# Patient Record
Sex: FEMALE | Race: WHITE | Employment: OTHER | URBAN - METROPOLITAN AREA
[De-identification: names, ages, dates, MRNs, and addresses within clinical notes are randomized per-mention and may not be internally consistent; named-entity substitution may affect disease eponyms.]

---

## 2017-07-11 ENCOUNTER — HOSPITAL ENCOUNTER (OUTPATIENT)
Dept: PHYSICAL THERAPY | Age: 72
Discharge: HOME | End: 2017-07-11
Payer: MEDICARE

## 2017-07-11 ENCOUNTER — HOSPITAL ENCOUNTER (OUTPATIENT)
Dept: SURGERY | Age: 72
Discharge: HOME | End: 2017-07-11
Payer: MEDICARE

## 2017-07-11 VITALS
DIASTOLIC BLOOD PRESSURE: 73 MMHG | HEART RATE: 68 BPM | TEMPERATURE: 97 F | RESPIRATION RATE: 18 BRPM | SYSTOLIC BLOOD PRESSURE: 160 MMHG | WEIGHT: 171 LBS | HEIGHT: 61 IN | OXYGEN SATURATION: 94 % | BODY MASS INDEX: 32.28 KG/M2

## 2017-07-11 LAB
ANION GAP BLD CALC-SCNC: 11 MMOL/L (ref 7–16)
APPEARANCE UR: CLEAR
APTT PPP: 25.7 SEC (ref 23.5–31.7)
BACTERIA SPEC CULT: NORMAL
BACTERIA URNS QL MICRO: 0 /HPF
BASOPHILS # BLD AUTO: 0.1 K/UL (ref 0–0.2)
BASOPHILS # BLD: 1 % (ref 0–2)
BILIRUB UR QL: NEGATIVE
BUN SERPL-MCNC: 16 MG/DL (ref 8–23)
CALCIUM SERPL-MCNC: 9.1 MG/DL (ref 8.3–10.4)
CASTS URNS QL MICRO: 0 /LPF
CHLORIDE SERPL-SCNC: 103 MMOL/L (ref 98–107)
CO2 SERPL-SCNC: 25 MMOL/L (ref 21–32)
COLOR UR: YELLOW
CREAT SERPL-MCNC: 0.89 MG/DL (ref 0.6–1)
DIFFERENTIAL METHOD BLD: ABNORMAL
EOSINOPHIL # BLD: 0.3 K/UL (ref 0–0.8)
EOSINOPHIL NFR BLD: 5 % (ref 0.5–7.8)
EPI CELLS #/AREA URNS HPF: ABNORMAL /HPF
ERYTHROCYTE [DISTWIDTH] IN BLOOD BY AUTOMATED COUNT: 13.7 % (ref 11.9–14.6)
GLUCOSE SERPL-MCNC: 177 MG/DL (ref 65–100)
GLUCOSE UR STRIP.AUTO-MCNC: NEGATIVE MG/DL
HCT VFR BLD AUTO: 45.6 % (ref 35.8–46.3)
HGB BLD-MCNC: 15.2 G/DL (ref 11.7–15.4)
HGB UR QL STRIP: ABNORMAL
IMM GRANULOCYTES # BLD: 0 K/UL (ref 0–0.5)
IMM GRANULOCYTES NFR BLD AUTO: 0.2 % (ref 0–5)
INR PPP: 0.9 (ref 0.9–1.2)
KETONES UR QL STRIP.AUTO: NEGATIVE MG/DL
LEUKOCYTE ESTERASE UR QL STRIP.AUTO: NEGATIVE
LYMPHOCYTES # BLD AUTO: 26 % (ref 13–44)
LYMPHOCYTES # BLD: 1.4 K/UL (ref 0.5–4.6)
MCH RBC QN AUTO: 30.8 PG (ref 26.1–32.9)
MCHC RBC AUTO-ENTMCNC: 33.3 G/DL (ref 31.4–35)
MCV RBC AUTO: 92.5 FL (ref 79.6–97.8)
MONOCYTES # BLD: 0.3 K/UL (ref 0.1–1.3)
MONOCYTES NFR BLD AUTO: 5 % (ref 4–12)
NEUTS SEG # BLD: 3.4 K/UL (ref 1.7–8.2)
NEUTS SEG NFR BLD AUTO: 63 % (ref 43–78)
NITRITE UR QL STRIP.AUTO: NEGATIVE
PH UR STRIP: 5.5 [PH] (ref 5–9)
PLATELET # BLD AUTO: 194 K/UL (ref 150–450)
PMV BLD AUTO: 10.2 FL (ref 10.8–14.1)
POTASSIUM SERPL-SCNC: 4.2 MMOL/L (ref 3.5–5.1)
PROT UR STRIP-MCNC: NEGATIVE MG/DL
PROTHROMBIN TIME: 10 SEC (ref 9.6–12)
RBC # BLD AUTO: 4.93 M/UL (ref 4.05–5.25)
RBC #/AREA URNS HPF: ABNORMAL /HPF
SERVICE CMNT-IMP: NORMAL
SODIUM SERPL-SCNC: 139 MMOL/L (ref 136–145)
SP GR UR REFRACTOMETRY: 1.02 (ref 1–1.02)
UROBILINOGEN UR QL STRIP.AUTO: 0.2 EU/DL (ref 0.2–1)
WBC # BLD AUTO: 5.5 K/UL (ref 4.3–11.1)
WBC URNS QL MICRO: ABNORMAL /HPF

## 2017-07-11 PROCEDURE — 85730 THROMBOPLASTIN TIME PARTIAL: CPT | Performed by: PHYSICIAN ASSISTANT

## 2017-07-11 PROCEDURE — G8978 MOBILITY CURRENT STATUS: HCPCS

## 2017-07-11 PROCEDURE — 97161 PT EVAL LOW COMPLEX 20 MIN: CPT

## 2017-07-11 PROCEDURE — 80048 BASIC METABOLIC PNL TOTAL CA: CPT | Performed by: PHYSICIAN ASSISTANT

## 2017-07-11 PROCEDURE — G8980 MOBILITY D/C STATUS: HCPCS

## 2017-07-11 PROCEDURE — 85025 COMPLETE CBC W/AUTO DIFF WBC: CPT | Performed by: PHYSICIAN ASSISTANT

## 2017-07-11 PROCEDURE — 77030027138 HC INCENT SPIROMETER -A

## 2017-07-11 PROCEDURE — G8979 MOBILITY GOAL STATUS: HCPCS

## 2017-07-11 PROCEDURE — 87641 MR-STAPH DNA AMP PROBE: CPT | Performed by: PHYSICIAN ASSISTANT

## 2017-07-11 PROCEDURE — 93005 ELECTROCARDIOGRAM TRACING: CPT | Performed by: ANESTHESIOLOGY

## 2017-07-11 PROCEDURE — 81001 URINALYSIS AUTO W/SCOPE: CPT | Performed by: PHYSICIAN ASSISTANT

## 2017-07-11 PROCEDURE — 85610 PROTHROMBIN TIME: CPT | Performed by: PHYSICIAN ASSISTANT

## 2017-07-11 PROCEDURE — 36415 COLL VENOUS BLD VENIPUNCTURE: CPT | Performed by: PHYSICIAN ASSISTANT

## 2017-07-11 RX ORDER — SELENIUM 100 MCG
TABLET ORAL DAILY
COMMUNITY
End: 2022-02-10 | Stop reason: ALTCHOICE

## 2017-07-11 NOTE — PERIOP NOTES
URINALYSIS W/ RFLX MICROSCOPIC [[id number]] (Order [id number])   Lab   Date: 7/11/2017 Department: [facility] Pre Assessment Released By: Edwina Maradiaga (auto-released) Authorizing: MARIA G Ko   7/11/2017 11:54 AM - Viraj, Lab [facility]   Component Results   Component Value Flag Ref Range Units Status   Color YELLOW     Final   Appearance CLEAR     Final   Specific gravity 1.018  1.001 - 1.023   Final   pH (UA) 5.5  5.0 - 9.0   Final   Protein NEGATIVE   NEG mg/dL Final   Glucose NEGATIVE    mg/dL Final   Ketone NEGATIVE   NEG mg/dL Final   Bilirubin NEGATIVE   NEG   Final   Blood MODERATE (A) NEG   Final   Urobilinogen 0.2  0.2 - 1.0 EU/dL Final   Nitrites NEGATIVE   NEG   Final   Leukocyte Esterase NEGATIVE   NEG   Final   WBC 0-3  0 /hpf Final   RBC 3-5  0 /hpf Final   Epithelial cells 3-5  0 /hpf Final   Bacteria 0  0 /hpf Final   Casts 0  0 /lpf Final

## 2017-07-11 NOTE — PERIOP NOTES
Patient verified name, , and surgery as listed in [facility]. Type 3 surgery, PAT joint assessment complete. Labs per surgeon: cbc, bmp, ua, pt/inr, ptt, mrsa/mssa swab, T&S DOS; results within anesthesia limits. Labs per anesthesia protocol: All required lab work included in surgeon's orders. Abnormal UA routed via [facility] to Dr. Aracelis Grover new orders received at this time. EKG: completed 17; results to be reviewed by anesthesia. 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 \" Anesthesia to review patient's chart, to determine if patient needs to be seen by Cardiology prior to procedure. Previous EKG, echo, stress, and office note requested from [facility] to be faxed to [phone]. Charge nurse to followd-up. Patient scheduled to see [facility] 17 at 1500. Office note to be printed and placed on chart after appointment. Charge nurse to follow-up. Hibiclens and instructions given per hospital policy. Nasal Swab collected per MD order and instructions for Mupirocin nasal ointment if required. Patient provided with handouts including Guide to Surgery, Pain Management, Hand Hygiene, Blood Transfusion Education, and [facility] Brochure. Patient answered medical/surgical history questions at their best of ability. All prior to admission medications documented in [facility]. Original medication prescription bottle NOT visualized during patient appointment. Patient instructed to hold all vitamins 7 days prior to surgery and NSAIDS 5 days prior to surgery. Medications to be held prior to surgery: All vitamins/supplements/herbals    Patient instructed to continue previous medications as prescribed prior to surgery and to take the following medications the day of surgery according to anesthesia guidelines with a small sip of water: None. Patient taught back and verbalized understanding.

## 2017-07-11 NOTE — PERIOP NOTES
Patient does not have a PCP, states she needs \"to find a new one. \" Patient provided \"find a physician\" card.

## 2017-07-11 NOTE — PROGRESS NOTES
17 1030   Oxygen Therapy   O2 Sat (%) 96 %   Pulse via Oximetry 73 beats per minute   O2 Device Room air   Pre-Treatment   Breath Sounds Bilateral Clear;Diminished   Pre FEV1 (liters) 1.4 liters   % Predicted 73   Incentive Spirometry Treatment   Actual Volume (ml) 1500 ml     Initial respiratory Assessment completed with pt. Pt was interviewed and evaluated in Joint camp prior to surgery. Patient ID:  Belkis Berkowitz  [id number]  5 y.o.  1945  Surgeon: Dr. Sarah Hernandez  Date of Surgery: 2017  Procedure: Total Right Knee Arthroplasty  Primary Care Physician: None None  Specialists:                                  Pt instructed in the use of Incentive Spirometry. Pt instructed to bring Incentive Spirometer back on date of surgery & to start using Is upon return to pt room. Pt taught proper cough technique      History of smokin-2 cigarettes  Day off & on for 12 years   Quit date:1977    Secondhand smoke:FATHER SMOKED CIGARS      Past procedures with Oxygen desaturation:PNEUMONTHORAX AFTER BIOPSEY    Past Medical History:   Diagnosis Date    Adverse effect of anesthesia     delayed awakening    Anxiety 2012    Bilateral tinnitus     Breast tenderness 2012    Environmental and seasonal allergies     Former smoker     Headache 2012    Hematuria, microscopic 2012    per patient Chronic-last seen by urology .      Hiatal hernia     Hyperglycemia 2012    Hemoglobin A1c 7 on 3/17/17, non-fasting glucose 177 on 17    Hyperlipidemia 2012    IBS (irritable bowel syndrome)     Osteoarthritis 2012    Sarcoidosis ([address] 2012    Thyroid nodule 2012    3 lesions     Tortuous aorta ([address]     per patient seen at [facility] \"several years ago\"    Unilateral primary osteoarthritis, right knee     Vitamin D deficiency 2012    Weight gain 2012                                    ENT:SINUS  DIFFICULTY SWALLOWING, CHOKES                                                                                                                      Respiratory history:SARCOIDOSIS, HX OF PNEUMONTHORAX                                 SOB  ON EXERTION                                   Respiratory meds:                                                         FAMILY PRESENT:                     NO                                        PAST SLEEP STUDY:        YES        HX OF JOSE RAFAEL:                        YES                                   JOSE RAFAEL assessment:                                               SLEEPS ON SIDE                                                   PHYSICAL EXAM   Body mass index is 32.31 kg/(m^2).    Visit Vitals    /73 (BP 1 Location: Right arm, BP Patient Position: At rest;Sitting)    Pulse 68    Temp 97 °F (36.1 °C)    Resp 18    Ht 5' 1\" (1.549 m)    Wt 77.6 kg (171 lb)    SpO2 94%    BMI 32.31 kg/m2     Neck circumference:  36    cm    Loud snoring:YES     Witnessed apnea or wakening gasping or choking:,DENIES,      Awakens with headaches:YES    Morning or daytime tiredness/ sleepiness:    TIRED     Dry mouth or sore throat in morning:YES    Freidman stage:4    SACS score:3    STOP/BAN                              CPAP:NONE                CONT SAT HS        Referrals:    Pt. Phone Number:

## 2017-07-11 NOTE — PROGRESS NOTES
Kang Tipton  : (16 y.o.) [address] at [address], [address] [place] [address]  [phone]       Physical Therapy Prehab Plan of Treatment and Evaluation Summary:2017    ICD-10: Treatment Diagnosis:   · Pain in Right Knee (M25.561)  · Stiffness of Right Knee, Not elsewhere classified (M25.661)  Precautions/Allergies:   Review of patient's allergies indicates no known allergies. MEDICAL/REFERRING DIAGNOSIS:  Unilateral primary osteoarthritis, right knee [M17.11]  REFERRING PHYSICIAN: Aniya Washington., *  DATE OF SURGERY: 17   Assessment:   Comments:  Motivated  and prepared for surgery. Plans on going home at [place] [address] (Impacting functional limitations):  Ms. Yesi Thompson presents with the following right lower extremity(s) problems:  1. Strength  2. Range of Motion  3. Home Exercise Program  4. Pain   INTERVENTIONS PLANNED:  1. Home Exercise Program  2. Educational Discussion     TREATMENT PLAN: Effective Dates: 2017 TO 2017. Frequency/Duration: Patient to continue to perform home exercise program at least twice per day up until her surgery. GOALS: (Goals have been discussed and agreed upon with patient.)  Discharge Goals: Time Frame: 1 Day  1. Patient will demonstrate independence with a home exercise program designed to increase strength, range of motion and pain control to minimize functional deficits and optimize patient for total joint replacement. Rehabilitation Potential For Stated Goals: Good  Regarding Luisito Kelley's therapy, I certify that the treatment plan above will be carried out by a therapist or under their direction.   Thank you for this referral,  Cheng Oviedo, PT               HISTORY:   Present Symptoms:  Pain Intensity 1: 10 (at its worst)  Pain Orientation 1: Anterior, Lateral, Medial, Right   History of Present Injury/Illness (Reason for Referral):  Medical/Referring Diagnosis: Unilateral primary osteoarthritis, right knee [M17.11]   Past Medical History/Comorbidities:   Ms. Lisy Barron  has a past medical history of Anxiety (2012); Bilateral tinnitus; Breast tenderness (2012); Headache (2012); Hematuria, microscopic (2012); Hiatal hernia; Hyperglycemia (2012); Hyperlipidemia (2012); IBS (irritable bowel syndrome); Osteoarthritis (2012); Sarcoidosis ([facility] (2012); Thyroid nodule (2012); Vitamin D deficiency (2012); and Weight gain (2012). Ms. Lisy Barron  has a past surgical history that includes cholecystectomy; endoscopy, colon, diagnostic; tonsillectomy;  section; and colonoscopy ().   Social History/Living Environment:   Home Environment: Private residence  # Steps to Enter: 0  One/Two Story Residence: Two story, live on 1st floor  # of Interior Steps: 12  Interior Rails: Left  Living Alone: No  Support Systems: Child(artie), Spouse/Significant Other/Partner  Patient Expects to be Discharged to[de-identified] Private residence  Current DME Used/Available at Home: Lenin Favors, rolling, Cane, straight, Crutches, Commode, bedside  Tub or Shower Type: Tub/Shower combination  Work/Activity:  retired  Dominant Side:  RIGHT  Current Medications:  See Pre-assessment nursing note   Number of Personal Factors/Comorbidities that affect the Plan of Care: 0: LOW COMPLEXITY   EXAMINATION:   ADLs (Current Functional Status):   Ambulation:  [x] Independent  [] Walk Indoors Only  [] Walk Outdoors  [] Use Assistive Device  [] Use Wheelchair Only Dressing:  [x] Independent  Requires Assistance from Someone for:  [] Sock/Shoes  [] Pants  [] Everything   Bathing/Showering:   [x] Independent  [] Requires Assistance from Someone  [] [address]:  [] Routine house and yard work  [x] Light Housework Only  [] None   Observation/Orthostatic Postural Assessment:       ROM/Flexibility:   AROM: Within functional limits (L LE)                       RLE AROM  R Knee Flexion: 110  R Knee Extension: 11   Strength:   Strength: Generally decreased, functional (L LE 4/5)              RLE Strength  R Hip Flexion: 4  R Knee Extension: 4  R Ankle Dorsiflexion: 4   Functional Mobility:    Sensation: Intact (LL E)    Stand to Sit: Independent  Sit to Stand: Independent  Stand Pivot Transfers: Independent  Distance (ft): 1000 Feet (ft)  Ambulation - Level of Assistance: Modified independent  Assistive Device: Crutches (1 crutch)  Base of Support: Widened  Speed/Angelia: Pace decreased (<100 feet/min)  Step Length: Left shortened  Stance: Right decreased  Gait Abnormalities: Antalgic          Balance:    Sitting: Intact  Standing: With support   Body Structures Involved:  1. Bones  2. Joints  3. Muscles Body Functions Affected:  1. Neuromusculoskeletal  2. Movement Related Activities and Participation Affected:  1. Mobility   Number of elements that affect the Plan of Care: 4+: HIGH COMPLEXITY   CLINICAL PRESENTATION:   Presentation: Stable and uncomplicated: LOW COMPLEXITY   CLINICAL DECISION MAKING:   Outcome Measure: Tool Used: Lower Extremity Functional Scale (LEFS)  Score:  Initial: 15/80 Most Recent: X/80 (Date: -- )   Interpretation of Score: 20 questions each scored on a 5 point scale with 0 representing \"extreme difficulty or unable to perform\" and 4 representing \"no difficulty\". The lower the score, the greater the functional disability. 80/80 represents no disability. Minimal detectable change is 9 points. Score 80 79-65 64-49 48-33 32-17 16-1 0   Modifier CH CI CJ CK CL CM CN     ?  Mobility - Walking and Moving Around:     - CURRENT STATUS: CM - 80%-99% impaired, limited or restricted    - GOAL STATUS: CM - 80%-99% impaired, limited or restricted    - D/C STATUS:  CM - 80%-99% impaired, limited or restricted    Medical Necessity:   · Ms. Sybil Correia is expected to optimize her lower extremity strength and ROM in preparation for joint replacement surgery. Reason for Services/Other Comments:  · Achieve baseline assesment of musculoskeletal system, functional mobility and home environment. , educate in PT HEP in preparation for surgery, educate in hospital plan of care. Use of outcome tool(s) and clinical judgement create a POC that gives a: Clear prediction of patient's progress: LOW COMPLEXITY   TREATMENT:   Treatment/Session Assessment:  Patient was instructed in PT- HEP to increase strength and ROM in LEs. Answered all questions. · Post session pain:  1  · Compliance with Program/Exercises: Will assess as treatment progresses.   Total Treatment Duration:PT Patient Time In/Time Out  Time In: 0015  Time Out: [address], [address]

## 2017-07-11 NOTE — ADVANCED PRACTICE NURSE
Total Joint Surgery Preoperative Chart Review      Patient ID:  Linda Wesley  [id number]  18 y.o.  1945  Surgeon: Dr. Anjum Torres  Date of Surgery: 2017  Procedure: Total Right Knee Arthroplasty  Primary Care Physician:  Tarik JOHNP [facility]. Specialty Physician(s):      Subjective:   Linda Wesley is a 80 y.o. WHITE OR  female who presents for preoperative evaluation for Total Right Knee arthroplasty. This is a preoperative chart review note based on data collected by the nurse at the surgical Pre-Assessment visit. Past Medical History:   Diagnosis Date    Adverse effect of anesthesia     delayed awakening    Anxiety 2012    Bilateral tinnitus     Breast tenderness 2012    Environmental and seasonal allergies     Former smoker     Headache 2012    Hematuria, microscopic 2012    per patient Chronic-last seen by urology .  Hiatal hernia     Hyperglycemia 2012    Hemoglobin A1c 7 on 3/17/17, non-fasting glucose 177 on 17    Hyperlipidemia 2012    IBS (irritable bowel syndrome)     Osteoarthritis 2012    Sarcoidosis ([address] 2012    Thyroid nodule 2012    3 lesions     Tortuous aorta ([address]     per patient seen at [facility] \"several years ago\"    Unilateral primary osteoarthritis, right knee     Vitamin D deficiency 2012    Weight gain 2012      Past Surgical History:   Procedure Laterality Date    ENDOSCOPY, COLON, DIAGNOSTIC      HX  SECTION      HX CHOLECYSTECTOMY      HX COLONOSCOPY  2012     repeat in 10 yrs.     HX TONSILLECTOMY  1964    HX TUMOR REMOVAL Left     pappilloma tumor left breast      Family History   Problem Relation Age of Onset    Stroke Mother     Thyroid Disease Mother     Anxiety Mother     Depression Mother     Heart Disease Father     High Cholesterol Father     Diabetes Father     Diabetes Brother     Diabetes Brother       Social History   Substance Use Topics    Smoking status: Former Smoker     Quit date: 1/1/1977    Smokeless tobacco: Never Used    Alcohol use Yes      Comment: rarely        Prior to Admission medications    Medication Sig Start Date End Date Taking? Authorizing Provider   TURMERIC ROOT EXTRACT PO Take 900 mg by mouth daily. Yes Historical Provider   vitamin D3-vitamin K2, MK4, (K2 PLUS D3) 1,000-100 unit-mcg tab Take  by mouth daily. Yes Historical Provider   selenium 100 mcg tab Take  by mouth daily. Yes Historical Provider   Vit D3 & K-Berberine-Hops 468-214- unit-mcg-mg-mg tab Take  by mouth daily. Yes Historical Provider   zinc 50 mg tab tablet Take 50 mg by mouth daily. Yes Historical Provider   cholecalciferol, vitamin D3, (VITAMIN D3) 2,000 unit tab Take 4,000 Units by mouth daily. 7/24/14  Yes Denise Chisholm MD   MAGNESIUM PO Take 250 mg by mouth daily. Yes Historical Provider   Omega-3-DHA-EPA-Fish Oil 1,000 (120-180) mg cap Take  by mouth.      Yes Historical Provider     No Known Allergies       Objective:     Physical Exam:   Patient Vitals for the past 24 hrs:   Temp Pulse Resp BP SpO2   07/11/17 1206 - - - 160/73 -   07/11/17 1204 97 °F (36.1 °C) 68 18 158/81 94 %       ECG:    EKG Results     Procedure 720 Value Units Date/Time    EKG, 12 LEAD, INITIAL [[id number]] Collected:  07/11/17 1213    Order Status:  Completed Updated:  07/11/17 1230     Ventricular Rate 62 BPM      Atrial Rate 62 BPM      P-R Interval 150 ms      QRS Duration 74 ms      Q-T Interval 384 ms      QTC Calculation (Bezet) 389 ms      Calculated P Axis 56 degrees      Calculated R Axis 2 degrees      Calculated T Axis 54 degrees      Diagnosis --     Normal sinus rhythm  Septal infarct , age undetermined  Abnormal ECG  No previous ECGs available            Data Review:   Labs:   Recent Results (from the past 24 hour(s))   URINALYSIS W/ RFLX MICROSCOPIC    Collection Time: 07/11/17 10:20 AM Result Value Ref Range    Color YELLOW      Appearance CLEAR      Specific gravity 1.018 1.001 - 1.023      pH (UA) 5.5 5.0 - 9.0      Protein NEGATIVE  NEG mg/dL    Glucose NEGATIVE  mg/dL    Ketone NEGATIVE  NEG mg/dL    Bilirubin NEGATIVE  NEG      Blood MODERATE (A) NEG      Urobilinogen 0.2 0.2 - 1.0 EU/dL    Nitrites NEGATIVE  NEG      Leukocyte Esterase NEGATIVE  NEG      WBC 0-3 0 /hpf    RBC 3-5 0 /hpf    Epithelial cells 3-5 0 /hpf    Bacteria 0 0 /hpf    Casts 0 0 /lpf   CBC WITH AUTOMATED DIFF    Collection Time: 07/11/17 10:45 AM   Result Value Ref Range    WBC 5.5 4.3 - 11.1 K/uL    RBC 4.93 4.05 - 5.25 M/uL    HGB 15.2 11.7 - 15.4 g/dL    HCT 45.6 35.8 - 46.3 %    MCV 92.5 79.6 - 97.8 FL    MCH 30.8 26.1 - 32.9 PG    MCHC 33.3 31.4 - 35.0 g/dL    RDW 13.7 11.9 - 14.6 %    PLATELET 501 549 - 270 K/uL    MPV 10.2 (L) 10.8 - 14.1 FL    DF AUTOMATED      NEUTROPHILS 63 43 - 78 %    LYMPHOCYTES 26 13 - 44 %    MONOCYTES 5 4.0 - 12.0 %    EOSINOPHILS 5 0.5 - 7.8 %    BASOPHILS 1 0.0 - 2.0 %    IMMATURE GRANULOCYTES 0.2 0.0 - 5.0 %    ABS. NEUTROPHILS 3.4 1.7 - 8.2 K/UL    ABS. LYMPHOCYTES 1.4 0.5 - 4.6 K/UL    ABS. MONOCYTES 0.3 0.1 - 1.3 K/UL    ABS. EOSINOPHILS 0.3 0.0 - 0.8 K/UL    ABS. BASOPHILS 0.1 0.0 - 0.2 K/UL    ABS. IMM.  GRANS. 0.0 0.0 - 0.5 K/UL   PROTHROMBIN TIME + INR    Collection Time: 07/11/17 10:45 AM   Result Value Ref Range    Prothrombin time 10.0 9.6 - 12.0 sec    INR 0.9 0.9 - 1.2     PTT    Collection Time: 07/11/17 10:45 AM   Result Value Ref Range    aPTT 25.7 23.5 - 67.3 SEC   METABOLIC PANEL, BASIC    Collection Time: 07/11/17 10:45 AM   Result Value Ref Range    Sodium 139 136 - 145 mmol/L    Potassium 4.2 3.5 - 5.1 mmol/L    Chloride 103 98 - 107 mmol/L    CO2 25 21 - 32 mmol/L    Anion gap 11 7 - 16 mmol/L    Glucose 177 (H) 65 - 100 mg/dL    BUN 16 8 - 23 MG/DL    Creatinine 0.89 0.6 - 1.0 MG/DL    GFR est AA >60 >60 ml/min/1.73m2    GFR est non-AA >60 >60 ml/min/1.73m2 Calcium 9.1 8.3 - 10.4 MG/DL   EKG, 12 LEAD, INITIAL    Collection Time: 07/11/17 12:13 PM   Result Value Ref Range    Ventricular Rate 62 BPM    Atrial Rate 62 BPM    P-R Interval 150 ms    QRS Duration 74 ms    Q-T Interval 384 ms    QTC Calculation (Bezet) 389 ms    Calculated P Axis 56 degrees    Calculated R Axis 2 degrees    Calculated T Axis 54 degrees    Diagnosis       Normal sinus rhythm  Septal infarct , age undetermined  Abnormal ECG  No previous ECGs available           Problem List:  )  Patient Active Problem List   Diagnosis Code    Breast tenderness N64.4    Anxiety F41.9    IBS (irritable bowel syndrome) K58.9    Headache R51    Hyperglycemia R73.9    Sarcoidosis (HCC) D86.9    Osteoarthritis M19.90    Hematuria, microscopic R31.29    Thyroid nodule E04.1    Vitamin D deficiency E55.9    Bilateral tinnitus H93.13       Total Joint Surgery Pre-Assessment Recommendations:           Patient had annual physical in March of 2017 but now reports that she is again looking for a new PCP. Patient to see [facility] for Sarcoidosis follow up tomorrow. Recommend continuous saturation monitoring hours of sleep, during hospitalization.       Signed By: SHANNEN Pepper    July 11, 2017

## 2017-07-12 ENCOUNTER — HOSPITAL ENCOUNTER (OUTPATIENT)
Dept: GENERAL RADIOLOGY | Age: 72
Discharge: HOME | End: 2017-07-12
Payer: MEDICARE

## 2017-07-12 DIAGNOSIS — Z01.818 PREOPERATIVE CLEARANCE: ICD-10-CM

## 2017-07-12 LAB
ATRIAL RATE: 62 BPM
CALCULATED P AXIS, ECG09: 56 DEGREES
CALCULATED R AXIS, ECG10: 2 DEGREES
CALCULATED T AXIS, ECG11: 54 DEGREES
DIAGNOSIS, 93000: NORMAL
P-R INTERVAL, ECG05: 150 MS
Q-T INTERVAL, ECG07: 384 MS
QRS DURATION, ECG06: 74 MS
QTC CALCULATION (BEZET), ECG08: 389 MS
VENTRICULAR RATE, ECG03: 62 BPM

## 2017-07-12 PROCEDURE — 71020 XR CHEST PA LAT: CPT

## 2017-07-12 NOTE — PERIOP NOTES
, anesthesia, reviewed and ok'd for surgery ekg's (12/17/12, 7/11/17), echo (11/30/10), wellness office visit note (3/17/17). Is aware of pt's reported hx of \"tortuous aorta\" and no cardiology visit for \"several years\". No need for card visit prior to surgery. Pt currently at pulm visit. Will log as a problem chart to review note when complete.

## 2017-07-12 NOTE — PERIOP NOTES
7/11/2017  8:07 PM - Viraj, Lab In [facility]   Component Results   Component Value Flag Ref Range Units Status   Special Requests: NO SPECIAL REQUESTS     Final   Culture result:      Final   SA target not detected.                                 A MRSA NEGATIVE, SA NEGATIVE test result does not preclude MRSA or SA nasal colonization.

## 2017-07-13 NOTE — PERIOP NOTES
Pulm clearance note dated 7/12/17 in EMR that reads:       Pre-op evaluation  Okay for surgery from the pulmonary standpoint with low risk of perioperative and postoperative complications from the pulmonary standpoint

## 2017-07-18 NOTE — H&P
[facility]  Pre Operative History and Physical Exam    Patient ID:  Linda Wesley  [id number]  39 y.o.  1945    Today: 2017       Assessment:   1. Arthritis of the right knee        Plan:    1. Proceed with scheduled Procedure(s) (LRB):  RIGHT KNEE ARTHROPLASTY TOTAL / DOMINIK/ FNB (Right)            CC:  Right knee pain    HPI:   The patient has end stage arthritis of the right knee. The patient was evaluated and examined during a consultation prior to this office visit. There have been no changes to the patient's orthopedic condition since the initial consultation. The patient has failed previous conservative treatment for this condition including antiinflammatories , and lifestyle modifications. The necessity for joint replacement is present. The patient will be admitted the day of surgery for Procedure(s) (LRB):  RIGHT KNEE ARTHROPLASTY TOTAL / DOMINIK/ FNB (Right)      Past Medical/Surgical History:  Past Medical History:   Diagnosis Date    Adverse effect of anesthesia     delayed awakening    Anxiety 2012    Bilateral tinnitus     Breast tenderness 2012    Environmental and seasonal allergies     Former smoker     Headache 2012    Hematuria, microscopic 2012    per patient Chronic-last seen by urology .      Hiatal hernia     Hyperglycemia 2012    Hemoglobin A1c 7 on 3/17/17, non-fasting glucose 177 on 17    Hyperlipidemia 2012    IBS (irritable bowel syndrome)     Osteoarthritis 2012    Sarcoidosis ([facility] [address] 2012    Thyroid nodule 2012    3 lesions     Tortuous aorta ([facility] [address]     per patient seen at [facility] [address] [facility] \"several years ago\"    Unilateral primary osteoarthritis, right knee     Vitamin D deficiency 2012    Weight gain 2012     Past Surgical History:   Procedure Laterality Date    ENDOSCOPY, COLON, DIAGNOSTIC      HX  SECTION      HX CHOLECYSTECTOMY      HX COLONOSCOPY  2012     repeat in 10 yrs.  HX TONSILLECTOMY  1964    HX TUMOR REMOVAL Left     pappilloma tumor left breast         Allergies: No Known Allergies     Objective:                    HEENT: NC/AT                   Lungs:  Unlabored respirations, clear breath sounds                    Heart:   RRR                    Abdomen: soft                   Extremities:  Pain with ROM of the right knee    Meds:   No current facility-administered medications for this encounter. Current Outpatient Prescriptions   Medication Sig    TURMERIC ROOT EXTRACT PO Take 900 mg by mouth daily.  vitamin D3-vitamin K2, MK4, (K2 PLUS D3) 1,000-100 unit-mcg tab Take  by mouth daily.  selenium 100 mcg tab Take  by mouth daily.  Vit D3 & K-Berberine-Hops 127-016- unit-mcg-mg-mg tab Take  by mouth daily.  zinc 50 mg tab tablet Take 50 mg by mouth daily.  cholecalciferol, vitamin D3, (VITAMIN D3) 2,000 unit tab Take 4,000 Units by mouth daily.  MAGNESIUM PO Take 250 mg by mouth daily.  Omega-3-DHA-EPA-Fish Oil 1,000 (120-180) mg cap Take  by mouth.            Labs:  Hospital Outpatient Visit on 07/11/2017   Component Date Value Ref Range Status    WBC 07/11/2017 5.5  4.3 - 11.1 K/uL Final    RBC 07/11/2017 4.93  4.05 - 5.25 M/uL Final    HGB 07/11/2017 15.2  11.7 - 15.4 g/dL Final    HCT 07/11/2017 45.6  35.8 - 46.3 % Final    MCV 07/11/2017 92.5  79.6 - 97.8 FL Final    MCH 07/11/2017 30.8  26.1 - 32.9 PG Final    MCHC 07/11/2017 33.3  31.4 - 35.0 g/dL Final    RDW 07/11/2017 13.7  11.9 - 14.6 % Final    PLATELET 69/10/3718 708  150 - 450 K/uL Final    MPV 07/11/2017 10.2* 10.8 - 14.1 FL Final    DF 07/11/2017 AUTOMATED    Final    NEUTROPHILS 07/11/2017 63  43 - 78 % Final    LYMPHOCYTES 07/11/2017 26  13 - 44 % Final    MONOCYTES 07/11/2017 5  4.0 - 12.0 % Final    EOSINOPHILS 07/11/2017 5  0.5 - 7.8 % Final    BASOPHILS 07/11/2017 1  0.0 - 2.0 % Final    IMMATURE GRANULOCYTES 07/11/2017 0.2  0.0 - 5.0 % Final    ABS. NEUTROPHILS 07/11/2017 3.4  1.7 - 8.2 K/UL Final    ABS. LYMPHOCYTES 07/11/2017 1.4  0.5 - 4.6 K/UL Final    ABS. MONOCYTES 07/11/2017 0.3  0.1 - 1.3 K/UL Final    ABS. EOSINOPHILS 07/11/2017 0.3  0.0 - 0.8 K/UL Final    ABS. BASOPHILS 07/11/2017 0.1  0.0 - 0.2 K/UL Final    ABS. IMM. GRANS. 07/11/2017 0.0  0.0 - 0.5 K/UL Final    Prothrombin time 07/11/2017 10.0  9.6 - 12.0 sec Final    INR 07/11/2017 0.9  0.9 - 1.2   Final    Comment: Suggested therapeutic INR range:  Venous thrombosis and embolus  2.0-3.0  Prosthetic heart valve         2.5-3.5      aPTT 07/11/2017 25.7  23.5 - 31.7 SEC Final    Heparin Therapeutic Range = 56.6-81.7 secs    Sodium 07/11/2017 139  136 - 145 mmol/L Final    Potassium 07/11/2017 4.2  3.5 - 5.1 mmol/L Final    Chloride 07/11/2017 103  98 - 107 mmol/L Final    CO2 07/11/2017 25  21 - 32 mmol/L Final    Anion gap 07/11/2017 11  7 - 16 mmol/L Final    Glucose 07/11/2017 177* 65 - 100 mg/dL Final    Comment: 47 - 60 mg/dl Consistent with, but not fully diagnostic of hypoglycemia. 101 - 125 mg/dl Impaired fasting glucose/consistent with pre-diabetes mellitus  > 126 mg/dl Fasting glucose consistent with overt diabetes mellitus      BUN 07/11/2017 16  8 - 23 MG/DL Final    Creatinine 07/11/2017 0.89  0.6 - 1.0 MG/DL Final    GFR est AA 07/11/2017 >60  >60 ml/min/1.73m2 Final    GFR est non-AA 07/11/2017 >60  >60 ml/min/1.73m2 Final    Comment: (NOTE)  Estimated GFR is calculated using the Modification of Diet in Renal   Disease (MDRD) Study equation, reported for both  Americans   (GFRAA) and non- Americans (GFRNA), and normalized to 1.73m2   body surface area. The physician must decide which value applies to   the patient. The MDRD study equation should only be used in   individuals age 25 or older.  It has not been validated for the   following: pregnant women, patients with serious comorbid conditions,   or on certain medications, or persons with extremes of body size,   muscle mass, or nutritional status.  Calcium 07/11/2017 9.1  8.3 - 10.4 MG/DL Final    Color 07/11/2017 YELLOW    Final    Appearance 07/11/2017 CLEAR    Final    Specific gravity 07/11/2017 1.018  1.001 - 1.023   Final    pH (UA) 07/11/2017 5.5  5.0 - 9.0   Final    Protein 07/11/2017 NEGATIVE   NEG mg/dL Final    Glucose 07/11/2017 NEGATIVE   mg/dL Final    Ketone 07/11/2017 NEGATIVE   NEG mg/dL Final    Bilirubin 07/11/2017 NEGATIVE   NEG   Final    Blood 07/11/2017 MODERATE* NEG   Final    Urobilinogen 07/11/2017 0.2  0.2 - 1.0 EU/dL Final    Nitrites 07/11/2017 NEGATIVE   NEG   Final    Leukocyte Esterase 07/11/2017 NEGATIVE   NEG   Final    WBC 07/11/2017 0-3  0 /hpf Final    RBC 07/11/2017 3-5  0 /hpf Final    Epithelial cells 07/11/2017 3-5  0 /hpf Final    Bacteria 07/11/2017 0  0 /hpf Final    Casts 07/11/2017 0  0 /lpf Final    Special Requests: 07/11/2017 NO SPECIAL REQUESTS    Final    Culture result: 07/11/2017 SA target not detected. A MRSA NEGATIVE, SA NEGATIVE test result does not preclude MRSA or SA nasal colonization.     Final    Ventricular Rate 07/11/2017 62  BPM Final    Atrial Rate 07/11/2017 62  BPM Final    P-R Interval 07/11/2017 150  ms Final    QRS Duration 07/11/2017 74  ms Final    Q-T Interval 07/11/2017 384  ms Final    QTC Calculation (Bezet) 07/11/2017 389  ms Final    Calculated P Axis 07/11/2017 56  degrees Final    Calculated R Axis 07/11/2017 2  degrees Final    Calculated T Axis 07/11/2017 54  degrees Final    Diagnosis 07/11/2017    Final                    Value:Normal sinus rhythm  Septal infarct , age undetermined  Abnormal ECG  No previous ECGs available  Confirmed by Josh Boykin MD (), KISHA VALDES ([id number]) on 7/12/2017 7:09:48 AM                   Patient Active Problem List   Diagnosis Code    Breast tenderness N64.4    Anxiety F41.9    IBS (irritable bowel syndrome) K58.9    Headache R51    Hyperglycemia R73.9    Sarcoidosis (HCC) D86.9    Osteoarthritis M19.90    Hematuria, microscopic R31.29    Thyroid nodule E04.1    Vitamin D deficiency E55.9    Bilateral tinnitus H93.13         Signed By: MARIA G Maldonado  July 18, 2017

## 2017-07-25 ENCOUNTER — ANESTHESIA EVENT (OUTPATIENT)
Dept: SURGERY | Age: 72
DRG: 470 | End: 2017-07-25
Payer: MEDICARE

## 2017-07-25 RX ORDER — SODIUM CHLORIDE, SODIUM LACTATE, POTASSIUM CHLORIDE, CALCIUM CHLORIDE 600; 310; 30; 20 MG/100ML; MG/100ML; MG/100ML; MG/100ML
100 INJECTION, SOLUTION INTRAVENOUS CONTINUOUS
Status: CANCELLED | OUTPATIENT
Start: 2017-07-25

## 2017-07-25 RX ORDER — OXYCODONE HYDROCHLORIDE 5 MG/1
5 TABLET ORAL
Status: CANCELLED | OUTPATIENT
Start: 2017-07-25

## 2017-07-25 RX ORDER — SODIUM CHLORIDE 0.9 % (FLUSH) 0.9 %
5-10 SYRINGE (ML) INJECTION AS NEEDED
Status: CANCELLED | OUTPATIENT
Start: 2017-07-25

## 2017-07-25 RX ORDER — OXYCODONE AND ACETAMINOPHEN 5; 325 MG/1; MG/1
1 TABLET ORAL AS NEEDED
Status: CANCELLED | OUTPATIENT
Start: 2017-07-25

## 2017-07-25 RX ORDER — DIPHENHYDRAMINE HYDROCHLORIDE 50 MG/ML
12.5 INJECTION, SOLUTION INTRAMUSCULAR; INTRAVENOUS ONCE
Status: CANCELLED | OUTPATIENT
Start: 2017-07-25 | End: 2017-07-25

## 2017-07-25 RX ORDER — HYDROMORPHONE HYDROCHLORIDE 2 MG/ML
0.5 INJECTION, SOLUTION INTRAMUSCULAR; INTRAVENOUS; SUBCUTANEOUS
Status: CANCELLED | OUTPATIENT
Start: 2017-07-25

## 2017-07-26 ENCOUNTER — ANESTHESIA (OUTPATIENT)
Dept: SURGERY | Age: 72
DRG: 470 | End: 2017-07-26
Payer: MEDICARE

## 2017-07-26 ENCOUNTER — HOSPITAL ENCOUNTER (INPATIENT)
Age: 72
LOS: 2 days | Discharge: HOME HEALTH | DRG: 470 | End: 2017-07-28
Attending: ORTHOPAEDIC SURGERY | Admitting: ORTHOPAEDIC SURGERY
Payer: MEDICARE

## 2017-07-26 DIAGNOSIS — Z96.651 STATUS POST TOTAL RIGHT KNEE REPLACEMENT: Primary | ICD-10-CM

## 2017-07-26 PROBLEM — M17.11 OSTEOARTHRITIS OF RIGHT KNEE: Status: ACTIVE | Noted: 2017-07-26

## 2017-07-26 LAB
ABO + RH BLD: NORMAL
BLOOD GROUP ANTIBODIES SERPL: NORMAL
GLUCOSE BLD STRIP.AUTO-MCNC: 134 MG/DL (ref 65–100)
GLUCOSE BLD STRIP.AUTO-MCNC: 224 MG/DL (ref 65–100)
HGB BLD-MCNC: 12.8 G/DL (ref 11.7–15.4)
SPECIMEN EXP DATE BLD: NORMAL

## 2017-07-26 PROCEDURE — 77030012890

## 2017-07-26 PROCEDURE — 77030003602 HC NDL NRV BLK BBMI -B: Performed by: ANESTHESIOLOGY

## 2017-07-26 PROCEDURE — 77030035236 HC SUT PDS STRATFX BARB J&J -B: Performed by: ORTHOPAEDIC SURGERY

## 2017-07-26 PROCEDURE — 82962 GLUCOSE BLOOD TEST: CPT

## 2017-07-26 PROCEDURE — 77030019940 HC BLNKT HYPOTHRM STRY -B: Performed by: ANESTHESIOLOGY

## 2017-07-26 PROCEDURE — 77030031139 HC SUT VCRL2 J&J -A: Performed by: ORTHOPAEDIC SURGERY

## 2017-07-26 PROCEDURE — 74011250636 HC RX REV CODE- 250/636: Performed by: ANESTHESIOLOGY

## 2017-07-26 PROCEDURE — 77030036688 HC BLNKT CLD THER S2SG -B

## 2017-07-26 PROCEDURE — 74011250636 HC RX REV CODE- 250/636

## 2017-07-26 PROCEDURE — 77030037363 HC FEM INST CR  DISP STRY -C: Performed by: ORTHOPAEDIC SURGERY

## 2017-07-26 PROCEDURE — 97165 OT EVAL LOW COMPLEX 30 MIN: CPT

## 2017-07-26 PROCEDURE — 77030011640 HC PAD GRND REM COVD -A: Performed by: ORTHOPAEDIC SURGERY

## 2017-07-26 PROCEDURE — 74011250637 HC RX REV CODE- 250/637: Performed by: PHYSICIAN ASSISTANT

## 2017-07-26 PROCEDURE — 77030019557 HC ELECTRD VES SEAL MEDT -F: Performed by: ORTHOPAEDIC SURGERY

## 2017-07-26 PROCEDURE — 77030006789 HC BLD SAW OSC STRY -C: Performed by: ORTHOPAEDIC SURGERY

## 2017-07-26 PROCEDURE — C1776 JOINT DEVICE (IMPLANTABLE): HCPCS | Performed by: ORTHOPAEDIC SURGERY

## 2017-07-26 PROCEDURE — 76010000171 HC OR TIME 2 TO 2.5 HR INTENSV-TIER 1: Performed by: ORTHOPAEDIC SURGERY

## 2017-07-26 PROCEDURE — 77030034849: Performed by: ORTHOPAEDIC SURGERY

## 2017-07-26 PROCEDURE — 77030008467 HC STPLR SKN COVD -B: Performed by: ORTHOPAEDIC SURGERY

## 2017-07-26 PROCEDURE — 77030002912 HC SUT ETHBND J&J -A: Performed by: ORTHOPAEDIC SURGERY

## 2017-07-26 PROCEDURE — 76010010054 HC POST OP PAIN BLOCK: Performed by: ORTHOPAEDIC SURGERY

## 2017-07-26 PROCEDURE — 77030006720 HC BLD PAT RMR ZIMM -B: Performed by: ORTHOPAEDIC SURGERY

## 2017-07-26 PROCEDURE — 76942 ECHO GUIDE FOR BIOPSY: CPT | Performed by: ORTHOPAEDIC SURGERY

## 2017-07-26 PROCEDURE — 74011000258 HC RX REV CODE- 258: Performed by: ORTHOPAEDIC SURGERY

## 2017-07-26 PROCEDURE — 74011000250 HC RX REV CODE- 250

## 2017-07-26 PROCEDURE — 74011000302 HC RX REV CODE- 302: Performed by: ORTHOPAEDIC SURGERY

## 2017-07-26 PROCEDURE — 77030025452 HC KT TIB SZR TRTH DSP STRY -B: Performed by: ORTHOPAEDIC SURGERY

## 2017-07-26 PROCEDURE — 77030013727 HC IRR FAN PULSVC ZIMM -B: Performed by: ORTHOPAEDIC SURGERY

## 2017-07-26 PROCEDURE — 76060000035 HC ANESTHESIA 2 TO 2.5 HR: Performed by: ORTHOPAEDIC SURGERY

## 2017-07-26 PROCEDURE — 65270000029 HC RM PRIVATE

## 2017-07-26 PROCEDURE — 36415 COLL VENOUS BLD VENIPUNCTURE: CPT | Performed by: PHYSICIAN ASSISTANT

## 2017-07-26 PROCEDURE — 94762 N-INVAS EAR/PLS OXIMTRY CONT: CPT

## 2017-07-26 PROCEDURE — 86580 TB INTRADERMAL TEST: CPT | Performed by: ORTHOPAEDIC SURGERY

## 2017-07-26 PROCEDURE — 0SRC0JZ REPLACEMENT OF RIGHT KNEE JOINT WITH SYNTHETIC SUBSTITUTE, OPEN APPROACH: ICD-10-PCS | Performed by: ORTHOPAEDIC SURGERY

## 2017-07-26 PROCEDURE — 94760 N-INVAS EAR/PLS OXIMETRY 1: CPT

## 2017-07-26 PROCEDURE — 74011250636 HC RX REV CODE- 250/636: Performed by: ORTHOPAEDIC SURGERY

## 2017-07-26 PROCEDURE — 74011000250 HC RX REV CODE- 250: Performed by: ANESTHESIOLOGY

## 2017-07-26 PROCEDURE — 77030020782 HC GWN BAIR PAWS FLX 3M -B: Performed by: ANESTHESIOLOGY

## 2017-07-26 PROCEDURE — 77010033678 HC OXYGEN DAILY

## 2017-07-26 PROCEDURE — 74011250636 HC RX REV CODE- 250/636: Performed by: PHYSICIAN ASSISTANT

## 2017-07-26 PROCEDURE — 77030032490 HC SLV COMPR SCD KNE COVD -B

## 2017-07-26 PROCEDURE — 77030007880 HC KT SPN EPDRL BBMI -B: Performed by: ANESTHESIOLOGY

## 2017-07-26 PROCEDURE — 77030018836 HC SOL IRR NACL ICUM -A: Performed by: ORTHOPAEDIC SURGERY

## 2017-07-26 PROCEDURE — 74011636637 HC RX REV CODE- 636/637: Performed by: INTERNAL MEDICINE

## 2017-07-26 PROCEDURE — 74011000250 HC RX REV CODE- 250: Performed by: ORTHOPAEDIC SURGERY

## 2017-07-26 PROCEDURE — 77030002966 HC SUT PDS J&J -A: Performed by: ORTHOPAEDIC SURGERY

## 2017-07-26 PROCEDURE — 85018 HEMOGLOBIN: CPT | Performed by: PHYSICIAN ASSISTANT

## 2017-07-26 PROCEDURE — 86900 BLOOD TYPING SEROLOGIC ABO: CPT | Performed by: PHYSICIAN ASSISTANT

## 2017-07-26 PROCEDURE — 97161 PT EVAL LOW COMPLEX 20 MIN: CPT

## 2017-07-26 PROCEDURE — 77030011208: Performed by: ORTHOPAEDIC SURGERY

## 2017-07-26 PROCEDURE — 77030003665 HC NDL SPN BBMI -A: Performed by: ANESTHESIOLOGY

## 2017-07-26 PROCEDURE — 77030012935 HC DRSG AQUACEL BMS -B: Performed by: ORTHOPAEDIC SURGERY

## 2017-07-26 PROCEDURE — 76210000016 HC OR PH I REC 1 TO 1.5 HR: Performed by: ORTHOPAEDIC SURGERY

## 2017-07-26 PROCEDURE — 74011250637 HC RX REV CODE- 250/637: Performed by: ANESTHESIOLOGY

## 2017-07-26 PROCEDURE — 77030037364 HC TIB INST CR  DISP STRY -C: Performed by: ORTHOPAEDIC SURGERY

## 2017-07-26 DEVICE — BASEPLATE TIB SZ 4 AP46MM ML70MM KNEE TRITANIUM 4 CRUCFRM: Type: IMPLANTABLE DEVICE | Site: KNEE | Status: FUNCTIONAL

## 2017-07-26 DEVICE — COMPNT FEM PS TRIATHLN 3 R PA --: Type: IMPLANTABLE DEVICE | Site: KNEE | Status: FUNCTIONAL

## 2017-07-26 DEVICE — COMPONENT PAT DIA32MM THK10MM SUPERIOR/INFERIOR KNEE: Type: IMPLANTABLE DEVICE | Site: KNEE | Status: FUNCTIONAL

## 2017-07-26 DEVICE — INSERT TIB SZ 4 9MM KNEE POLY POST STBL CONVENTIONAL: Type: IMPLANTABLE DEVICE | Site: KNEE | Status: FUNCTIONAL

## 2017-07-26 RX ORDER — SODIUM CHLORIDE 9 MG/ML
100 INJECTION, SOLUTION INTRAVENOUS CONTINUOUS
Status: DISPENSED | OUTPATIENT
Start: 2017-07-26 | End: 2017-07-28

## 2017-07-26 RX ORDER — CEFAZOLIN SODIUM IN 0.9 % NACL 2 G/50 ML
2 INTRAVENOUS SOLUTION, PIGGYBACK (ML) INTRAVENOUS ONCE
Status: COMPLETED | OUTPATIENT
Start: 2017-07-26 | End: 2017-07-26

## 2017-07-26 RX ORDER — CELECOXIB 200 MG/1
200 CAPSULE ORAL EVERY 12 HOURS
Status: DISCONTINUED | OUTPATIENT
Start: 2017-07-26 | End: 2017-07-28 | Stop reason: HOSPADM

## 2017-07-26 RX ORDER — SODIUM CHLORIDE 0.9 % (FLUSH) 0.9 %
5-10 SYRINGE (ML) INJECTION AS NEEDED
Status: DISCONTINUED | OUTPATIENT
Start: 2017-07-26 | End: 2017-07-26 | Stop reason: HOSPADM

## 2017-07-26 RX ORDER — DEXAMETHASONE SODIUM PHOSPHATE 100 MG/10ML
10 INJECTION INTRAMUSCULAR; INTRAVENOUS ONCE
Status: ACTIVE | OUTPATIENT
Start: 2017-07-27 | End: 2017-07-28

## 2017-07-26 RX ORDER — AMOXICILLIN 250 MG
2 CAPSULE ORAL DAILY
Status: DISCONTINUED | OUTPATIENT
Start: 2017-07-27 | End: 2017-07-28 | Stop reason: HOSPADM

## 2017-07-26 RX ORDER — TRANEXAMIC ACID 100 MG/ML
INJECTION, SOLUTION INTRAVENOUS AS NEEDED
Status: DISCONTINUED | OUTPATIENT
Start: 2017-07-26 | End: 2017-07-26 | Stop reason: HOSPADM

## 2017-07-26 RX ORDER — LIDOCAINE HYDROCHLORIDE 20 MG/ML
INJECTION, SOLUTION EPIDURAL; INFILTRATION; INTRACAUDAL; PERINEURAL AS NEEDED
Status: DISCONTINUED | OUTPATIENT
Start: 2017-07-26 | End: 2017-07-26 | Stop reason: HOSPADM

## 2017-07-26 RX ORDER — KETOROLAC TROMETHAMINE 30 MG/ML
INJECTION, SOLUTION INTRAMUSCULAR; INTRAVENOUS AS NEEDED
Status: DISCONTINUED | OUTPATIENT
Start: 2017-07-26 | End: 2017-07-26 | Stop reason: HOSPADM

## 2017-07-26 RX ORDER — BUPIVACAINE HYDROCHLORIDE 7.5 MG/ML
INJECTION, SOLUTION INTRASPINAL AS NEEDED
Status: DISCONTINUED | OUTPATIENT
Start: 2017-07-26 | End: 2017-07-26 | Stop reason: HOSPADM

## 2017-07-26 RX ORDER — FAMOTIDINE 20 MG/1
20 TABLET, FILM COATED ORAL ONCE
Status: COMPLETED | OUTPATIENT
Start: 2017-07-26 | End: 2017-07-26

## 2017-07-26 RX ORDER — DIPHENHYDRAMINE HCL 25 MG
25 CAPSULE ORAL
Status: DISCONTINUED | OUTPATIENT
Start: 2017-07-26 | End: 2017-07-28 | Stop reason: HOSPADM

## 2017-07-26 RX ORDER — ACETAMINOPHEN 500 MG
1000 TABLET ORAL EVERY 6 HOURS
Status: DISCONTINUED | OUTPATIENT
Start: 2017-07-27 | End: 2017-07-28 | Stop reason: HOSPADM

## 2017-07-26 RX ORDER — MIDAZOLAM HYDROCHLORIDE 1 MG/ML
INJECTION, SOLUTION INTRAMUSCULAR; INTRAVENOUS AS NEEDED
Status: DISCONTINUED | OUTPATIENT
Start: 2017-07-26 | End: 2017-07-26 | Stop reason: HOSPADM

## 2017-07-26 RX ORDER — LIDOCAINE HYDROCHLORIDE 10 MG/ML
0.1 INJECTION INFILTRATION; PERINEURAL AS NEEDED
Status: DISCONTINUED | OUTPATIENT
Start: 2017-07-26 | End: 2017-07-26 | Stop reason: HOSPADM

## 2017-07-26 RX ORDER — INSULIN LISPRO 100 [IU]/ML
INJECTION, SOLUTION INTRAVENOUS; SUBCUTANEOUS
Status: DISCONTINUED | OUTPATIENT
Start: 2017-07-26 | End: 2017-07-28 | Stop reason: HOSPADM

## 2017-07-26 RX ORDER — ACETAMINOPHEN 10 MG/ML
1000 INJECTION, SOLUTION INTRAVENOUS ONCE
Status: COMPLETED | OUTPATIENT
Start: 2017-07-26 | End: 2017-07-26

## 2017-07-26 RX ORDER — NALOXONE HYDROCHLORIDE 0.4 MG/ML
.2-.4 INJECTION, SOLUTION INTRAMUSCULAR; INTRAVENOUS; SUBCUTANEOUS
Status: DISCONTINUED | OUTPATIENT
Start: 2017-07-26 | End: 2017-07-28 | Stop reason: HOSPADM

## 2017-07-26 RX ORDER — HYDROMORPHONE HYDROCHLORIDE 1 MG/ML
1 INJECTION, SOLUTION INTRAMUSCULAR; INTRAVENOUS; SUBCUTANEOUS
Status: DISCONTINUED | OUTPATIENT
Start: 2017-07-26 | End: 2017-07-28

## 2017-07-26 RX ORDER — ONDANSETRON 2 MG/ML
4 INJECTION INTRAMUSCULAR; INTRAVENOUS
Status: DISCONTINUED | OUTPATIENT
Start: 2017-07-26 | End: 2017-07-28 | Stop reason: HOSPADM

## 2017-07-26 RX ORDER — SODIUM CHLORIDE, SODIUM LACTATE, POTASSIUM CHLORIDE, CALCIUM CHLORIDE 600; 310; 30; 20 MG/100ML; MG/100ML; MG/100ML; MG/100ML
100 INJECTION, SOLUTION INTRAVENOUS CONTINUOUS
Status: DISCONTINUED | OUTPATIENT
Start: 2017-07-26 | End: 2017-07-26 | Stop reason: HOSPADM

## 2017-07-26 RX ORDER — NEOMYCIN AND POLYMYXIN B SULFATES 40; 200000 MG/ML; [USP'U]/ML
SOLUTION IRRIGATION AS NEEDED
Status: DISCONTINUED | OUTPATIENT
Start: 2017-07-26 | End: 2017-07-26 | Stop reason: HOSPADM

## 2017-07-26 RX ORDER — SODIUM CHLORIDE 9 MG/ML
50 INJECTION, SOLUTION INTRAVENOUS CONTINUOUS
Status: DISCONTINUED | OUTPATIENT
Start: 2017-07-26 | End: 2017-07-26 | Stop reason: HOSPADM

## 2017-07-26 RX ORDER — MIDAZOLAM HYDROCHLORIDE 1 MG/ML
2 INJECTION, SOLUTION INTRAMUSCULAR; INTRAVENOUS ONCE
Status: COMPLETED | OUTPATIENT
Start: 2017-07-26 | End: 2017-07-26

## 2017-07-26 RX ORDER — HYDROMORPHONE HYDROCHLORIDE 2 MG/1
2 TABLET ORAL
Status: DISCONTINUED | OUTPATIENT
Start: 2017-07-26 | End: 2017-07-28 | Stop reason: HOSPADM

## 2017-07-26 RX ORDER — ASPIRIN 325 MG
325 TABLET, DELAYED RELEASE (ENTERIC COATED) ORAL EVERY 12 HOURS
Status: DISCONTINUED | OUTPATIENT
Start: 2017-07-26 | End: 2017-07-28 | Stop reason: HOSPADM

## 2017-07-26 RX ORDER — FENTANYL CITRATE 50 UG/ML
25 INJECTION, SOLUTION INTRAMUSCULAR; INTRAVENOUS ONCE
Status: COMPLETED | OUTPATIENT
Start: 2017-07-26 | End: 2017-07-26

## 2017-07-26 RX ORDER — MIDAZOLAM HYDROCHLORIDE 1 MG/ML
2 INJECTION, SOLUTION INTRAMUSCULAR; INTRAVENOUS
Status: DISCONTINUED | OUTPATIENT
Start: 2017-07-26 | End: 2017-07-26 | Stop reason: HOSPADM

## 2017-07-26 RX ORDER — ROPIVACAINE HYDROCHLORIDE 2 MG/ML
INJECTION, SOLUTION EPIDURAL; INFILTRATION; PERINEURAL AS NEEDED
Status: DISCONTINUED | OUTPATIENT
Start: 2017-07-26 | End: 2017-07-26 | Stop reason: HOSPADM

## 2017-07-26 RX ORDER — SODIUM CHLORIDE 0.9 % (FLUSH) 0.9 %
5-10 SYRINGE (ML) INJECTION EVERY 8 HOURS
Status: DISCONTINUED | OUTPATIENT
Start: 2017-07-26 | End: 2017-07-28 | Stop reason: HOSPADM

## 2017-07-26 RX ORDER — SODIUM CHLORIDE 0.9 % (FLUSH) 0.9 %
5-10 SYRINGE (ML) INJECTION EVERY 8 HOURS
Status: DISCONTINUED | OUTPATIENT
Start: 2017-07-26 | End: 2017-07-26 | Stop reason: HOSPADM

## 2017-07-26 RX ORDER — PROPOFOL 10 MG/ML
INJECTION, EMULSION INTRAVENOUS
Status: DISCONTINUED | OUTPATIENT
Start: 2017-07-26 | End: 2017-07-26 | Stop reason: HOSPADM

## 2017-07-26 RX ORDER — SODIUM CHLORIDE 0.9 % (FLUSH) 0.9 %
5-10 SYRINGE (ML) INJECTION AS NEEDED
Status: DISCONTINUED | OUTPATIENT
Start: 2017-07-26 | End: 2017-07-28 | Stop reason: HOSPADM

## 2017-07-26 RX ORDER — CEFAZOLIN SODIUM IN 0.9 % NACL 2 G/50 ML
2 INTRAVENOUS SOLUTION, PIGGYBACK (ML) INTRAVENOUS EVERY 8 HOURS
Status: COMPLETED | OUTPATIENT
Start: 2017-07-26 | End: 2017-07-27

## 2017-07-26 RX ADMIN — CEFAZOLIN 2 G: 1 INJECTION, POWDER, FOR SOLUTION INTRAMUSCULAR; INTRAVENOUS; PARENTERAL at 10:51

## 2017-07-26 RX ADMIN — INSULIN LISPRO 4 UNITS: 100 INJECTION, SOLUTION INTRAVENOUS; SUBCUTANEOUS at 23:57

## 2017-07-26 RX ADMIN — ONDANSETRON 4 MG: 2 INJECTION INTRAMUSCULAR; INTRAVENOUS at 22:52

## 2017-07-26 RX ADMIN — CELECOXIB 200 MG: 200 CAPSULE ORAL at 22:05

## 2017-07-26 RX ADMIN — HYDROMORPHONE HYDROCHLORIDE 2 MG: 2 TABLET ORAL at 15:34

## 2017-07-26 RX ADMIN — LIDOCAINE HYDROCHLORIDE 20 MG: 20 INJECTION, SOLUTION EPIDURAL; INFILTRATION; INTRACAUDAL; PERINEURAL at 11:10

## 2017-07-26 RX ADMIN — SODIUM CHLORIDE, SODIUM LACTATE, POTASSIUM CHLORIDE, AND CALCIUM CHLORIDE: 600; 310; 30; 20 INJECTION, SOLUTION INTRAVENOUS at 10:51

## 2017-07-26 RX ADMIN — MIDAZOLAM HYDROCHLORIDE 1 MG: 1 INJECTION, SOLUTION INTRAMUSCULAR; INTRAVENOUS at 10:58

## 2017-07-26 RX ADMIN — SODIUM CHLORIDE, SODIUM LACTATE, POTASSIUM CHLORIDE, AND CALCIUM CHLORIDE 1000 ML: 600; 310; 30; 20 INJECTION, SOLUTION INTRAVENOUS at 09:12

## 2017-07-26 RX ADMIN — MIDAZOLAM HYDROCHLORIDE 1 MG: 1 INJECTION, SOLUTION INTRAMUSCULAR; INTRAVENOUS at 10:56

## 2017-07-26 RX ADMIN — LIDOCAINE HYDROCHLORIDE 0.1 ML: 10 INJECTION, SOLUTION INFILTRATION; PERINEURAL at 09:12

## 2017-07-26 RX ADMIN — SODIUM CHLORIDE 100 ML/HR: 900 INJECTION, SOLUTION INTRAVENOUS at 19:02

## 2017-07-26 RX ADMIN — MIDAZOLAM HYDROCHLORIDE 2 MG: 1 INJECTION, SOLUTION INTRAMUSCULAR; INTRAVENOUS at 10:12

## 2017-07-26 RX ADMIN — CEFAZOLIN 2 G: 1 INJECTION, POWDER, FOR SOLUTION INTRAMUSCULAR; INTRAVENOUS; PARENTERAL at 19:02

## 2017-07-26 RX ADMIN — FENTANYL CITRATE 25 MCG: 50 INJECTION, SOLUTION INTRAMUSCULAR; INTRAVENOUS at 10:12

## 2017-07-26 RX ADMIN — ASPIRIN 325 MG: 325 TABLET, DELAYED RELEASE ORAL at 22:05

## 2017-07-26 RX ADMIN — ROPIVACAINE HYDROCHLORIDE 20 ML: 2 INJECTION, SOLUTION EPIDURAL; INFILTRATION; PERINEURAL at 10:14

## 2017-07-26 RX ADMIN — FAMOTIDINE 20 MG: 20 TABLET ORAL at 09:14

## 2017-07-26 RX ADMIN — TUBERCULIN PURIFIED PROTEIN DERIVATIVE 5 UNITS: 5 INJECTION, SOLUTION INTRADERMAL at 09:13

## 2017-07-26 RX ADMIN — PROPOFOL 100 MCG/KG/MIN: 10 INJECTION, EMULSION INTRAVENOUS at 11:10

## 2017-07-26 RX ADMIN — BUPIVACAINE HYDROCHLORIDE 1.8 ML: 7.5 INJECTION, SOLUTION INTRASPINAL at 11:05

## 2017-07-26 RX ADMIN — ONDANSETRON 4 MG: 2 INJECTION INTRAMUSCULAR; INTRAVENOUS at 15:34

## 2017-07-26 RX ADMIN — TRANEXAMIC ACID 1000 MG: 100 INJECTION, SOLUTION INTRAVENOUS at 11:06

## 2017-07-26 RX ADMIN — ACETAMINOPHEN 1000 MG: 10 INJECTION, SOLUTION INTRAVENOUS at 18:10

## 2017-07-26 NOTE — PERIOP NOTES
Teach back method used with patient concerning hibiclens wash, TB screening, incentive spirometer , pain management and educated pt and family on home discharge needs list

## 2017-07-26 NOTE — PERIOP NOTES
TRANSFER - OUT REPORT:    Verbal report given to Kay ottoniel(name) on Jodi Agrawal  being transferred to [facility] area(unit) for routine progression of care       Report consisted of patients Situation, Background, Assessment and   Recommendations(SBAR). Information from the following report(s) SBAR and MAR was reviewed with the receiving nurse. Lines:   Peripheral IV 07/26/17 Left Hand (Active)   Site Assessment Clean, dry, & intact 7/26/2017  9:10 AM   Phlebitis Assessment 0 7/26/2017  9:10 AM   Infiltration Assessment 0 7/26/2017  9:10 AM   Dressing Status Clean, dry, & intact; Intact 7/26/2017  9:10 AM   Dressing Type Tape 7/26/2017  9:10 AM   Hub Color/Line Status Green; Infusing 7/26/2017  9:10 AM        Opportunity for questions and clarification was provided. Patient transported with:   Tech   SI=[id number]  Reddened area to sacral area above buttock fold-pt states its \"fever blisters\" from sarcoidosis-approx.  Dime sized area without drainage

## 2017-07-26 NOTE — ANESTHESIA POSTPROCEDURE EVALUATION
Post-Anesthesia Evaluation and Assessment    Patient: Nena Diehl MRN: [id number]  SSN: [id number]    YOB: 1945  Age: 80 y.o. Sex: female       Cardiovascular Function/Vital Signs  Visit Vitals    /63    Pulse (!) 59    Temp 36.2 °C (97.2 °F)    Resp 16    Ht 5' 1\" (1.549 m)    Wt 77.6 kg (171 lb)    SpO2 100%    BMI 32.31 kg/m2       Patient is status post spinal, regional anesthesia for Procedure(s):  RIGHT KNEE ARTHROPLASTY TOTAL / DOMINIK/ FNB. Nausea/Vomiting: None    Postoperative hydration reviewed and adequate. Pain:  Pain Scale 1: Numeric (0 - 10) (07/26/17 1303)  Pain Intensity 1: 0 (07/26/17 1303)   Managed    Neurological Status:   Neuro (WDL): Exceptions to WDL (07/26/17 1303)  Neuro  Neurologic State: Alert (07/26/17 1303)  Orientation Level: Oriented to person;Oriented to place (07/26/17 1303)  Cognition: Appropriate decision making; Follows commands (07/26/17 1303)  Speech: Clear (07/26/17 1303)  LUE Motor Response: Purposeful (07/26/17 1303)  LLE Motor Response: Numbness (07/26/17 1303)  RUE Motor Response: Purposeful (07/26/17 1303)  RLE Motor Response: Numbness (07/26/17 1303)   At baseline    Mental Status and Level of Consciousness: Arousable    Pulmonary Status:   O2 Device: Nasal cannula (07/26/17 1303)   Adequate oxygenation and airway patent    Complications related to anesthesia: None    Post-anesthesia assessment completed.  No concerns    Signed By: Tim Mckeon MD     July 26, 2017

## 2017-07-26 NOTE — PROGRESS NOTES
Problem: Mobility Impaired (Adult and Pediatric)  Goal: *Acute Goals and Plan of Care (Insert Text)  GOALS (1-4 days):  (1.)Ms. Ashok Carlin will move from supine to sit and sit to supine in bed with SUPERVISION. (2.)Ms. Ashok Carlin will transfer from bed to chair and chair to bed with SUPERVISION using the least restrictive device. (3.)Ms. Ashok Carlin will ambulate with SUPERVISION for 200 feet with the least restrictive device. (4.)Ms. Ashok Carlin will increase right knee ROM to 5°-80°.  ________________________________________________________________________________________________       PHYSICAL THERAPY JOINT CAMP TKA: INITIAL ASSESSMENT 7/26/2017  INPATIENT: Hospital Day: 1  [place] / Product Type: Medicare /      NAME/AGE/GENDER: Zuleima Leonard is a 80 y.o. female      PRIMARY DIAGNOSIS:  Unilateral primary osteoarthritis, right knee [M17.11]              Procedure(s) and Anesthesia Type:     * RIGHT KNEE ARTHROPLASTY TOTAL / DOMINIK/ FNB - Spinal (Right)  ICD-10: Treatment Diagnosis:        · Stiffness of Right Knee, Not elsewhere classified (M25.661)  · Difficulty in walking, Not elsewhere classified (R26.2)       ASSESSMENT:      Ms. Ashok Carlin presents with decreased R LE strength and ROM and decreased independence with mobility s/p TKA. Patient ambulating with 1 crutch prior to surgery and anxious to get back to walking/exercising. Patient plans on returning home at discharge with assist from family and HHPT. She would benefit from continued therapy to improve her safety and independence prior to discharge. This section established at most recent assessment   PROBLEM LIST (Impairments causing functional limitations):  1. Decreased Strength  2. Decreased ADL/Functional Activities  3. Decreased Transfer Abilities  4. Decreased Ambulation Ability/Technique  5.  Decreased Balance    INTERVENTIONS PLANNED: (Benefits and precautions of physical therapy have been discussed with the patient.)  1. Bed Mobility  2. Gait Training  3. Home Exercise Program (HEP)  4. Therapeutic Exercise/Strengthening  5. Transfer Training  6. Range of Motion: active/assisted/passive  7. Therapeutic Activities  8. Group Therapy      TREATMENT PLAN: Frequency/Duration: Follow patient BID   to address above goals. Rehabilitation Potential For Stated Goals: GOOD      RECOMMENDED REHABILITATION/EQUIPMENT: (at time of discharge pending progress): Continue Skilled Therapy and Home Health: Physical Therapy. HISTORY:   History of Present Injury/Illness (Reason for Referral):  R TKA 17  Past Medical History/Comorbidities:   Ms. Karyle Horner  has a past medical history of Adverse effect of anesthesia; Anxiety (2012); Bilateral tinnitus; Breast tenderness (2012); Environmental and seasonal allergies; Former smoker; Headache (2012); Hematuria, microscopic (2012); Hiatal hernia; Hyperglycemia (2012); Hyperlipidemia (2012); IBS (irritable bowel syndrome); Osteoarthritis (2012); Sarcoidosis ([facility] [address] (2012); Thyroid nodule (2012); Tortuous aorta ([facility] [address] Unilateral primary osteoarthritis, right knee; Vitamin D deficiency (2012); and Weight gain (2012). She also has no past medical history of Difficult intubation; Malignant hyperthermia due to anesthesia; Nausea & vomiting; or Pseudocholinesterase deficiency. Ms. Karyle Horner  has a past surgical history that includes cholecystectomy; endoscopy, colon, diagnostic; tonsillectomy ();  section (); colonoscopy (); and tumor removal (Left).   Social History/Living Environment:   Home Environment: Private residence  # Steps to Enter: 0  One/Two Story Residence: Two story, live on 1st floor  # of Interior Steps: 12  Interior Rails: Left  Living Alone: No  Support Systems: Spouse/Significant Other/Partner  Patient Expects to be Discharged to[de-identified] Private residence  Current DME Used/Available at Home: Cane, straight, Commode, bedside, Crutches, Walker, Walker, rolling  Tub or Shower Type: Tub/Shower combination  Prior Level of Function/Work/Activity:  Ambulating with single crutch   Number of Personal Factors/Comorbidities that affect the Plan of Care: 1-2: MODERATE COMPLEXITY   EXAMINATION:   Most Recent Physical Functioning:      Gross Assessment  AROM: Generally decreased, functional (R TKA)  Strength: Generally decreased, functional (R TKA)                        Bed Mobility  Supine to Sit: Minimum assistance  Sit to Supine: Minimum assistance     Transfers  Sit to Stand: Minimum assistance  Stand to Sit: Minimum assistance     Balance  Sitting: Intact  Standing: Pull to stand; With support                Weight Bearing Status  Right Side Weight Bearing: As tolerated  Distance (ft): 4 Feet (ft)  Ambulation - Level of Assistance: Minimal assistance  Assistive Device: Walker, rolling  Speed/Angelia: Slow  Step Length: Left shortened;Right shortened  Stance: Right decreased  Interventions: Safety awareness training;Verbal cues      Braces/Orthotics: none     Right Knee Cold  Type: Cryocuff       Body Structures Involved:  1. Joints  2. Muscles Body Functions Affected:  1. Movement Related Activities and Participation Affected:  1. Mobility  2. Self Care  3. Domestic Life  4. Community, Social and [place]   Number of elements that affect the Plan of Care: 4+: HIGH COMPLEXITY   CLINICAL PRESENTATION:   Presentation: Stable and uncomplicated: LOW COMPLEXITY   CLINICAL DECISION MAKIN [facility] Inpatient Short Form  How much difficulty does the patient currently have. .. Unable A Lot A Little None   1. Turning over in bed (including adjusting bedclothes, sheets and blankets)? [ ] 1   [ ] 2   [X] 3   [ ] 4   2. Sitting down on and standing up from a chair with arms ( e.g., wheelchair, bedside commode, etc.)   [ ] 1   [ ] 2   [X] 3   [ ] 4   3.   Moving from lying on back to sitting on the side of the bed? [ ] 1   [ ] 2   [X] 3   [ ] 4   How much help from another person does the patient currently need. .. Total A Lot A Little None   4. Moving to and from a bed to a chair (including a wheelchair)? [ ] 1   [ ] 2   [X] 3   [ ] 4   5. Need to walk in hospital room? [ ] 1   [ ] 2   [X] 3   [ ] 4   6. Climbing 3-5 steps with a railing? [ ] 1   [ ] 2   [X] 3   [ ] 4   © 2007, Trustees of [address] [place] [address] [address] [id number], under license to [employer]. All rights reserved       Score:  Initial: 18 Most Recent: X (Date: -- )     Interpretation of Tool:  Represents activities that are increasingly more difficult (i.e. Bed mobility, Transfers, Gait). Score 24 23 22-20 19-15 14-10 9-7 6       Modifier CH CI CJ CK CL CM CN         · Mobility - Walking and Moving Around:               - CURRENT STATUS:    CK - 40%-59% impaired, limited or restricted               - GOAL STATUS:           CJ - 20%-39% impaired, limited or restricted               - D/C STATUS:                       ---------------To be determined---------------  [place] / Product Type: Medicare /       Medical Necessity:     · Patient is expected to demonstrate progress in strength, range of motion, balance and coordination to increase independence with mobility and ADLs. · Patient demonstrates good rehab potential due to higher previous functional level. Reason for Services/Other Comments:  · Patient continues to require skilled intervention due to decreased R LE strength and ROM and decreased independence with mobility s/p TKA. Use of outcome tool(s) and clinical judgement create a POC that gives a: Clear prediction of patient's progress: LOW COMPLEXITY                 TREATMENT:   (In addition to Assessment/Re-Assessment sessions the following treatments were rendered)      Pre-treatment Symptoms/Complaints:  Patient reports some nausea.   Pain: Initial:   Pain Intensity 1: 0  Post Session:  0      Assessment/Reassessment only, no treatment provided today        Date:    Date:    Date:      ACTIVITY/EXERCISE AM PM AM PM AM PM   GROUP THERAPY  [ ]  [ ]  [ ]  [ ]  [ ]  [ ]   Ankle Pumps               Quad Sets               Gluteal Sets               Hip ABd/ADduction               Straight Leg Raises               Knee Slides               Short Arc Quads               Long Arc Quads               Chair Slides                               B = bilateral; AA = active assistive; A = active; P = passive       Treatment/Session Assessment:         Response to Treatment:  Patient tolerated therapy assessment well and expect her to make good progress. Education:  [ ] Home Exercises  [ ] Fall Precautions  [ ] Hip Precautions [ ] D/C Instruction Review  [ ] Knee/Hip Prosthesis Review  [ ] Aislinn Ro Management/Safety [ ] Adaptive Equipment as Needed         Interdisciplinary Collaboration:   · Physical Therapist  · Occupational Therapist  · Registered Nurse     After treatment position/precautions:   · Supine in bed  · Bed/Chair-wheels locked  · Bed in low position  · Call light within reach  · Family at bedside  · Side rails x 3     Compliance with Program/Exercises: compliant all of the time. Recommendations/Intent for next treatment session:  Treatment next visit will focus on increasing Ms. Kelley's independence with bed mobility, transfers, gait training, strength/ROM exercises, modalities for pain, and patient education.        Total Treatment Duration:  PT Patient Time In/Time Out  Time In: 1500  Time Out: Malika Davies [address] Tarik, PT

## 2017-07-26 NOTE — ANESTHESIA PROCEDURE NOTES
Spinal Block    Start time: 7/26/2017 11:01 AM  End time: 7/26/2017 11:05 AM  Performed by: Saturnino Valenzuela  Authorized by: Saturnino Valenzuela     Pre-procedure:   Indications: at surgeon's request and primary anesthetic  Preanesthetic Checklist: patient identified, risks and benefits discussed, anesthesia consent, site marked, patient being monitored and timeout performed    Timeout Time: 11:01          Spinal Block:   Patient Position:  Seated  Prep Region:  Lumbar  Prep: Betadine      Location:  L3-4  Technique:  Single shot  Local:  Lidocaine 1%  Local Dose (mL):  3    Needle:   Needle Type:  Pencan    Attempts:  1      Events: CSF confirmed, no blood with aspiration and no paresthesia        Assessment:  Insertion:  Uncomplicated  Patient tolerance:  Patient tolerated the procedure well with no immediate complications

## 2017-07-26 NOTE — PROGRESS NOTES
Problem: Self Care Deficits Care Plan (Adult)  Goal: *Acute Goals and Plan of Care (Insert Text)  GOALS:   DISCHARGE GOALS (in preparation for going home/rehab): 3 days  1. Ms. Lisy Barron will perform one lower body dressing activity with minimal assistance required to demonstrate improved functional mobility and safety. 2. Ms. Lisy Barron will perform one lower body bathing activity with minimal assistance required to demonstrate improved functional mobility and safety. 3. Ms. Lisy Barron will perform toileting/toilet transfer with contact guard assistance to demonstrate improved functional mobility and safety. 4. Ms. Lisy Barron will perform shower transfer with contact guard assistance to demonstrate improved functional mobility and safety. JOINT CAMP OCCUPATIONAL THERAPY TKA: Initial Assessment 7/26/2017  INPATIENT: Hospital Day: 1  [place] / [place] / Product Type: Medicare /      NAME/AGE/GENDER: Iris Calero is a 80 y.o. female      PRIMARY DIAGNOSIS:  Unilateral primary osteoarthritis, right knee [M17.11]              Procedure(s) and Anesthesia Type:     * RIGHT KNEE ARTHROPLASTY TOTAL / DOMINIK/ FNB - Spinal (Right)  ICD-10: Treatment Diagnosis:        · Pain in Right Knee (M25.561)  · Stiffness of Right Knee, Not elsewhere classified (F81.369)       ASSESSMENT:      Ms. Lisy Barron is s/p right TKA and presents with decreased weight bearing on right LE and decreased independence with functional mobility and activities of daily living. Patient would benefit from skilled Occupational Therapy to maximize independence and safety with self-care task and functional mobility. Pt would also benefit from education on adaptive equipment and safety precautions in preparation for going home or for recommendations for post-hospital rehab program.  Patient plans for further rehab at home with home health services and good family support. OT reviewed therapy schedule and plan of care with patient. Patient was able to transfer and preform self care skills as charted below. Patient instructed to call for assistance when needing to get up from the bed and all needs in reach. Patient verbalized understanding of call light. This section established at most recent assessment   PROBLEM LIST (Impairments causing functional limitations):  1. Decreased Strength  2. Decreased ADL/Functional Activities  3. Decreased Transfer Abilities  4. Increased Pain  5. Increased Fatigue  6. Decreased Flexibility/Joint Mobility  7. Decreased Knowledge of Precautions    INTERVENTIONS PLANNED: (Benefits and precautions of occupational therapy have been discussed with the patient.)  1. Activities of daily living training  2. Adaptive equipment training  3. Balance training  4. Clothing management  5. Donning&doffing training  6. Theraputic activity      TREATMENT PLAN: Frequency/Duration: Follow patient 1-2 times to address above goals. Rehabilitation Potential For Stated Goals: GOOD      RECOMMENDED REHABILITATION/EQUIPMENT: (at time of discharge pending progress): Continue Skilled Therapy and Home Health: Physical Therapy. OCCUPATIONAL PROFILE AND HISTORY:   History of Present Injury/Illness (Reason for Referral): Pt presents this date s/p (right) TKA. Past Medical History/Comorbidities:   Ms. Skip Somers  has a past medical history of Adverse effect of anesthesia; Anxiety (11/26/2012); Bilateral tinnitus; Breast tenderness (11/26/2012); Environmental and seasonal allergies; Former smoker; Headache (11/26/2012); Hematuria, microscopic (11/26/2012); Hiatal hernia; Hyperglycemia (11/26/2012); Hyperlipidemia (11/26/2012); IBS (irritable bowel syndrome); Osteoarthritis (11/26/2012); Sarcoidosis ([address] (11/26/2012); Thyroid nodule (11/26/2012); Tortuous aorta ([address] Unilateral primary osteoarthritis, right knee; Vitamin D deficiency (11/26/2012); and Weight gain (11/26/2012).  She also has no past medical history of Difficult intubation; Malignant hyperthermia due to anesthesia; Nausea & vomiting; or Pseudocholinesterase deficiency. Ms. Sybil Correia  has a past surgical history that includes cholecystectomy; endoscopy, colon, diagnostic; tonsillectomy ();  section (); colonoscopy (); and tumor removal (Left). Social History/Living Environment:   Home Environment: Private residence  # Steps to Enter: 0  One/Two Story Residence: Two story, live on 1st floor  # of Interior Steps: 12  Interior Rails: Left  Living Alone: No  Support Systems: Spouse/Significant Other/Partner  Patient Expects to be Discharged to[de-identified] Private residence  Current DME Used/Available at Home: Emigdio Ma, straight, Commode, bedside, Crutches, Walker, Walker, rolling  Tub or Shower Type: Tub/Shower combination  Prior Level of Function/Work/Activity:  Independent       Number of Personal Factors/Comorbidities that affect the Plan of Care: Brief history (0):  LOW COMPLEXITY   ASSESSMENT OF OCCUPATIONAL PERFORMANCE[de-identified]   Most Recent Physical Functioning:   Balance  Sitting: Intact  Standing: Pull to stand; With support        Patient Vitals for the past 6 hrs:       BP BP Patient Position SpO2 O2 Flow Rate (L/min) Pulse   17 0948 136/63 At rest 99 % 2 l/min 62   17 1012 157/74 At rest 97 % 4 l/min 70   17 1014 116/56 At rest 97 % 4 l/min 70   17 1019 116/56 At rest 98 % 4 l/min 65   17 1024 119/57 At rest 98 % 4 l/min 68   17 1029 119/59 At rest 98 % 4 l/min 67   17 1044 126/60 At rest 98 % 2 l/min 74   17 1303 122/60 At rest 97 % 2 l/min 61   17 1308 111/54 - 95 % - 60   17 1313 127/60 - 95 % - 70   17 1318 127/60 - 97 % - (!) 58   17 1333 133/63 - 100 % - (!) 59   17 1351 124/58 - 98 % 2 l/min 69   17 1401 133/61 - 99 % 2 l/min (!) 59   17 1444 128/64 - 96 % - 75        Gross Assessment  AROM: (P) Generally decreased, functional (R TKA)  Strength: (P) Generally decreased, functional (R TKA)              Coordination  Fine Motor Skills-Upper: Left Intact; Right Intact  Gross Motor Skills-Upper: Left Intact; Right Intact           Mental Status  Neurologic State: Alert  Orientation Level: Oriented X4  Cognition: Appropriate decision making  Perception: Appears intact  Perseveration: No perseveration noted  Safety/Judgement: Awareness of environment                    Basic ADLs (From Assessment) Complex ADLs (From Assessment)   Basic ADL  Feeding: Independent  Oral Facial Hygiene/Grooming: Supervision  Bathing: Moderate assistance  Upper Body Dressing: Supervision  Lower Body Dressing: Moderate assistance  Toileting: Moderate assistance     Grooming/Bathing/Dressing Activities of Daily Living     Cognitive Retraining  Safety/Judgement: Awareness of environment                 Functional Transfers  Toilet Transfer : Moderate assistance  Shower Transfer: Moderate assistance                 Physical Skills Involved:  1. Range of Motion  2. Balance  3. Strength  4. Pain (acute) Cognitive Skills Affected (resulting in the inability to perform in a timely and safe manner): 1. none Psychosocial Skills Affected:  1. Environmental Adaptation   Number of elements that affect the Plan of Care: 3-5:  MODERATE COMPLEXITY   CLINICAL DECISION MAKIN [facility] [address] [address] AM-PAC 6 Clicks   Daily Activity Inpatient Short Form  How much help from another person does the patient currently need. .. Total A Lot A Little None   1. Putting on and taking off regular lower body clothing?   [ ] 1   [X] 2   [ ] 3   [ ] 4   2. Bathing (including washing, rinsing, drying)? [ ] 1   [X] 2   [ ] 3   [ ] 4   3. Toileting, which includes using toilet, bedpan or urinal?   [ ] 1   [ ] 2   [X] 3   [ ] 4   4. Putting on and taking off regular upper body clothing?   [ ] 1   [ ] 2   [ ] 3   [X] 4   5. Taking care of personal grooming such as brushing teeth? [ ] 1   [ ] 2   [ ] 3   [X] 4   6.   Eating meals?   [ ] 1   [ ] 2   [ ] 3   [X] 4   © 2007, Trustees of [address] [place] [address] [address] [address], under license to [employer]. All rights reserved   Score:  Initial: 19 Most Recent: X (Date: -- )     Interpretation of Tool:  Represents activities that are increasingly more difficult (i.e. Bed mobility, Transfers, Gait). Score 24 23 22-20 19-15 14-10 9-7 6       Modifier CH CI CJ CK CL CM CN         · Self Care:               - CURRENT STATUS:    CK - 40%-59% impaired, limited or restricted               - GOAL STATUS:           CJ - 20%-39% impaired, limited or restricted               - D/C STATUS:              ---------------To be determined---------------     [place] / Product Type: Medicare /       Medical Necessity:     · Patient is expected to demonstrate progress in range of motion, balance and functional technique to increase independence with self care. Reason for Services/Other Comments:  · Patient would benefit from skilled Occupational Therapy to maximize independence and safety with self-care task and functional mobility. .   Use of outcome tool(s) and clinical judgement create a POC that gives a: LOW COMPLEXITY                 TREATMENT:   (In addition to Assessment/Re-Assessment sessions the following treatments were rendered)      Pre-treatment Symptoms/Complaints:  Pt with no complaint of pain but with nausea    Pain: Initial:   Pain Intensity 1: 0 0 Post Session:  0      Assessment/Reassessment only, no treatment provided today     Treatment/Session Assessment:         Response to Treatment:  Pt tolerated well.      Education:  [ ] Home Exercises  [X] Fall Precautions  [ ] Hip Precautions [ ] Going Home Video  [X] Knee/Hip Prosthesis Review  [X] Walker Management/Safety [X] Adaptive Equipment as Needed         Interdisciplinary Collaboration:   · Physical Therapist  · Occupational Therapist  · Registered Nurse     After treatment position/precautions: · Supine in bed  · Bed/Chair-wheels locked  · Call light within reach  · RN notified  · Family at bedside     Compliance with Program/Exercises: compliant all of the time. Recommendations/Intent for next treatment session:  Treatment next visit will focus on increasing Ms. Kelley's independence with bed mobility, transfers, self care, functional mobility, modalities for pain, and patient education.        Total Treatment Duration:  OT Patient Time In/Time Out  Time In: 1510  Time Out: [address]

## 2017-07-26 NOTE — OP NOTES
[facility]  Total Knee Arthroplasty  Patient:Nicolas Kelley   : 1945  Medical Record Number:[id number]  Pre-operative Diagnosis:  Unilateral primary osteoarthritis, right knee [M17.11]  Post-operative Diagnosis: Osteoarthritis of right knee  Location: [address] [place] [address]  Surgeon: Vitor West MD  Assistant: Eugenie Adamson PA-C    Anesthesia: Spinal and FNB    Procedure: Procedure(s) (LRB):  RIGHT KNEE ARTHROPLASTY TOTAL / DOMINIK/ FNB (Right)    The complexity of the total joint surgery requires the use of a first assistant for positioning, retraction and expertise in closure. Tourniquet Time: 0 minutes  EBL: 250cc  Findings: severe degenerative arthritis, patellar osteophytes, posterior femoral osteophytes   BMI: Body mass index is 32.31 kg/(m^2). Jessica Perea was brought to the operating room and positioned on the operating table. She was anethestized with anesthesia. A hargrove catheter was placed preoperatively and IV antibiotics was administered. Prior to the incision being made a timeout was called identifying the patient, procedure ,operative side and surgeon. .The operative leg was prepped and draped in the usual sterile manner. An anterior longitudinal incision was accomplished just medial to the tibial tubercle and extending approximal 6 centimeters proximal to the superior pole of the patella. A medial parapatellar capsular incision was performed. The medial capsular flap was elevated around to the insertion of the semimembranous tendon. The patella was everted and the knee flexed and externally rotated. The medial and external menisci were excised. The lateral half of the fat pad excised and the patella femoral ligament was released. The anterior cruciate ligament was resected and the posterior cruciate ligament was substituted. Using extramedullary instrumentation, the tibial cut was accomplished with appropriate posterior slope.   Approxiamately 9mm of bone was removed from the high side of the tibia. The distal femur was next addressed. A drill hole was made above the intracondolar notch. Using appropriate intramedullary instrumentation,a five degree valgus distal cut was accomplished. The femur was sized. The anterior and posterior cuts were then made about the distal femur. The osteophytes were removed from the tibial and femoral surfaces. The flexion and extension gaps were assessed with the appropriate spacer blocks. Additional surgical procedures included: none. The flexion and extension gaps were deemed appropriately balanced. The appropriate cutting blocks were then utilized to perform the anterior chamfer, posterior chamfer and notch cuts, with appropriate lateral tranlation accomplished for the patellofemoral groove. The tibia baseplate was sized. The tibial base plate was pinned into place with the appropriate external rotation and stem site prepared. A preliminary range of motion was accomplished with  trial components. The patient was able to obtain full extension as well as appropriate flexion. The patient's ligaments were stable in flexion and extension to medial and lateral stressing and the alignment was through the appropriate mechanical axis. The patella was then everted. The bone was resected to accomodate the three peg  patella button. A trial reduction revealed appropriate tracking through the patellofemoral groove with no lateral retinacular release being accomplished. All trial components were removed. The knee was irrigated. There were no femoral deficiencies. There were no tibial deficiencies. No augmentation was utilize. The permanent Tibial and Femoral components were impacted into place. The patella component was then  pressed into place. [facility] knee was placed through range of motion and noted to be stable as mentioned above with the trail components.   The wound was dry, therefore no drain was used. The operative knee was injected with 60cc of Naropin, 10 cc's of morphine and 1 cc of 30mg of Toradol. The knee was then soaked with a diluted betadine solution for approximately 3 min. This was then thoroughly irrigated. The capsular layer was closed using a #1 PDS suture. The knee joint was then injected with transexamic acid. The subcutaneous layers were closed using 2-0 Stratafix suture. Finally the skin was closed using 3-0 Vicryl and skin staples, which were applied in occlusive fashion and sterile bandage applied. An Iceman cryo pad was applied on the operative leg. Sponge count and needle counts were correct. Gilma Rao left the operating room     Implants:   Implant Name Type Inv.  Item Serial No.  Lot No. LRB No. Used   PAT ASYM MTL-BK 10MM SZ A32 -- TRIATHLON - [id number]  PAT ASYM MTL-BK 10MM SZ A32 -- TRIATHLON [id number] [employer] HOW [id number] Right 1   COMPNT FEM PS TRIATHLN 3 R PA --  - [id number]  COMPNT FEM PS TRIATHLN 3 R PA --  [id number] [employer] HOW [id number] Right 1   BASEPLT TIB PC TRITNM SZ 4 -- TRIATHLON - [id number]  BASEPLT TIB PC TRITNM SZ 4 -- TRIATHLON [id number] [facility] [employer] HOW [id number] Right 1   INSERT TIB PS TRIATHLN 4 9MM --  - [id number]   INSERT TIB PS TRIATHLN 4 9MM --  [id number] [employer] HOW [id number] Right 1           Signed By: Krystyna Dean MD  7/26/2017,  1:41 PM

## 2017-07-26 NOTE — PERIOP NOTES
TRANSFER - OUT REPORT:    Verbal report given to TRISHA Aponte on Zuleima Leonard  being transferred to room 314 for routine progression of care       Report consisted of patients Situation, Background, Assessment and   Recommendations(SBAR). Information from the following report(s) SBAR, OR Summary, Procedure Summary, Intake/Output and MAR was reviewed with the receiving nurse. Opportunity for questions and clarification was provided.       Patient transported with:   O2 @ 2 liters

## 2017-07-26 NOTE — H&P
The patient has end stage arthritis of the right knee. The patient was see and examined and there are no changes to the patient's orthopedic condition. They have tried conservative treatment for this condition; including antiinflammatories and lifestyle modifications and have failed. The necessity for the joint replacement is still present, and the H&P from the office is still current.  The patient will be admitted today for Procedure(s) (LRB):  RIGHT KNEE ARTHROPLASTY TOTAL / DOMINIK/ FNB (Right)

## 2017-07-26 NOTE — IP AVS SNAPSHOT
Oli Blank 
 
 
 [address] 
[phone] Patient: Jose Alberto Bryant MRN: [id number] :1945 You are allergic to the following No active allergies Immunizations Administered for This Admission Name Date  
 TB Skin Test (PPD) Intradermal 2017 Recent Documentation Height Weight Breastfeeding? BMI OB Status Smoking Status 1.549 m 77.6 kg No 32.31 kg/m2 Postmenopausal Former Smoker Emergency Contacts Name Discharge Info Relation Home Work Mobile Donna Harvey  Daughter [21] [phone] Inocente Kelley  Spouse [3] [phone] About your hospitalization You were admitted on:  2017 You last received care in the:  [facility] You were discharged on:  2017 Unit phone number:  [phone] Why you were hospitalized Your primary diagnosis was:  Status Post Total Right Knee Replacement Your diagnoses also included:  Osteoarthritis Of Right Knee Providers Seen During Your Hospitalizations Provider Role Specialty Primary office phone August Cannon MD Attending Provider Orthopedic Surgery [phone] Your Primary Care Physician (PCP) Primary Care Physician Office Phone Office Fax NONE ** None ** ** None ** Follow-up Information Follow up With Details Comments Contact Info None   None ([phone]) Patient stated that they have no PCP August Cannon MD  Keep scheduled appointment [address] [employer] [facility] [address] 
[phone] [facility] Therapy   [phone] Current Discharge Medication List  
  
START taking these medications Dose & Instructions Dispensing Information Comments Morning Noon Evening Bedtime  
 aspirin delayed-release 325 mg tablet Your next dose is:  tonight  Dose:  325 mg  
 Take 1 Tab by mouth every twelve (12) hours every twelve (12) hours. Indications: Thrombosis Prevention after PCI Quantity:  70 Tab Refills:  0  
     
   
   
   
  
 tapentadol 50 mg tablet Commonly known as:  Jose De La Paz Your next dose is:  As needed Dose:  50 mg Take 50 mg by mouth every four (4) hours as needed for Pain. Max Daily Amount: 300 mg. Quantity:  40 Tab Refills:  0 CONTINUE these medications which have NOT CHANGED Dose & Instructions Dispensing Information Comments Morning Noon Evening Bedtime K2 PLUS D3 1,000-100 unit-mcg Tab Generic drug:  vitamin D3-vitamin K2 (MK4) Your next dose is:  tomorrow Take  by mouth daily. Refills:  0 MAGNESIUM PO Your next dose is:  tomorrow Dose:  250 mg Take 250 mg by mouth daily. Refills:  0  
     
   
   
   
  
 selenium 100 mcg Tab Your next dose is:  tomorrow Take  by mouth daily. Refills:  0 Vit D3 & K-Berberine-Hops 707-965- unit-mcg-mg-mg Tab Your next dose is:  tomorrow Take  by mouth daily. Refills:  0  
     
   
   
   
  
 VITAMIN D3 2,000 unit Tab Generic drug:  cholecalciferol (vitamin D3) Your next dose is:  tomorrow Dose:  4000 Units Take 4,000 Units by mouth daily. Refills:  0  
     
   
   
   
  
 zinc 50 mg Tab tablet Your next dose is:  tomorrow Dose:  50 mg Take 50 mg by mouth daily. Refills:  0 STOP taking these medications Omega-3-DHA-EPA-Fish Oil 1,000 mg (120 mg-180 mg) Cap TURMERIC ROOT EXTRACT PO Where to Get Your Medications These medications were sent to [address] [place], [place] [address], [place] [place] [address] Phone:  [phone]  
  aspirin delayed-release 325 mg tablet Information on where to get these meds will be given to you by the nurse or doctor. ! Ask your nurse or doctor about these medications  
  tapentadol 50 mg tablet Discharge Instructions Call your doctor for any of the following: 
 
Temp greater than 101 Nausea or vomiting Pain unrelieved by medication Swelling, numbness or tingling Yellow or green drainage from incision Questions or concerns Do not take a tub bath. You may shower. Do not remove your bandage. The home health staff will change the bandage as needed. Only sit in chairs with arms. You will need them to help you stand. Always use your walker until the physical therapist instructs you to use a cane. Only perform exercises as instructed by your therapist. 
 
  
Total Knee Replacement: What to Expect at Home Your Recovery When you leave the hospital, you should be able to move around with a walker or crutches. But you will need someone to help you at home for the next few weeks or until you have more energy and can move around better. If there is no one to help you at home, you may go to a rehabilitation center. You will go home with a bandage and stitches or staples. Change the bandage as your doctor tells you to. Your doctor will remove your stitches or staples 10 to 21 days after your surgery. You may still have some mild pain, and the area may be swollen for 3 to 6 months after surgery. Your knee will continue to improve for 6 to 12 months. You will probably use a walker for 1 to 3 weeks and then use crutches. When you are ready, you can use a cane. You will probably be able to walk on your own in 4 to 8 weeks. You will need to do months of physical rehabilitation (rehab) after a knee replacement. Rehab will help you strengthen the muscles of the knee and help you regain movement. After you recover, your artificial knee will allow you to do normal daily activities with less pain or no pain at all. You may be able to hike, dance, ride a bike, and play golf. Talk to your doctor about whether you can do more strenuous activities. Always tell your caregivers that you have an artificial knee. How long it will take to walk on your own, return to normal activities, and go back to work depends on your health and how well your rehabilitation (rehab) program goes. The better you do with your rehab exercises, the quicker you will get your strength and movement back. This care sheet gives you a general idea about how long it will take for you to recover. But each person recovers at a different pace. Follow the steps below to get better as quickly as possible. How can you care for yourself at home? Activity · Rest when you feel tired. You may take a nap, but do not stay in bed all day. When you sit, use a chair with arms. You can use the arms to help you stand up. · Work with your physical therapist to find the best way to exercise. You may be able to take frequent, short walks using crutches or a walker. What you can do as your knee heals will depend on whether your new knee is cemented or uncemented. You may not be able to do certain things for a while if your new knee is uncemented. · After your knee has healed enough, you can do more strenuous activities with caution. ¨ You can golf, but use a golf cart, and do not wear shoes with spikes. ¨ You can bike on a flat road or on a stationary bike. Avoid biking up hills. ¨ Your doctor may suggest that you stay away from activities that put stress on your knee. These include tennis or badminton, squash or racquetball, contact sports like football, jumping (such as in basketball), jogging, or running. ¨ Avoid activities where you might fall. These include horseback riding, skiing, and mountain biking. · Do not sit for more than 1 hour at a time. Get up and walk around for a while before you sit again.  If you must sit for a long time, prop up your leg with a chair or footstool. This will help you avoid swelling. · Ask your doctor when you can shower. You may need to take sponge baths until your stitches or staples have been removed. · Ask your doctor when you can drive again. It may take up to 8 weeks after knee replacement surgery before it is safe for you to drive. · When you get into a car, sit on the edge of the seat. Then pull in your legs, and turn to face the front. · You should be able to do many everyday activities 3 to 6 weeks after your surgery. You will probably need to take 4 to 16 weeks off from work. When you can go back to work depends on the type of work you do and how you feel. · Ask your doctor when it is okay for you to have sex. · Do not lift anything heavier than 10 pounds and do not lift weights for 12 weeks. Diet · By the time you leave the hospital, you should be eating your normal diet. If your stomach is upset, try bland, low-fat foods like plain rice, broiled chicken, toast, and yogurt. Your doctor may suggest that you take iron and vitamin supplements. · Drink plenty of fluids (unless your doctor tells you not to). · Eat healthy foods, and watch your portion sizes. Try to stay at your ideal weight. Too much weight puts more stress on your new knee. · You may notice that your bowel movements are not regular right after your surgery. This is common. Try to avoid constipation and straining with bowel movements. You may want to take a fiber supplement every day. If you have not had a bowel movement after a couple of days, ask your doctor about taking a mild laxative. Medicines · Your doctor will tell you if and when you can restart your medicines. He or she will also give you instructions about taking any new medicines. · If you take blood thinners, such as warfarin (Coumadin), clopidogrel (Plavix), or aspirin, be sure to talk to your doctor.  He or she will tell you if and when to start taking those medicines again. Make sure that you understand exactly what your doctor wants you to do. · Your doctor may give you a blood-thinning medicine to prevent blood clots. If you take a blood thinner, be sure you get instructions about how to take your medicine safely. Blood thinners can cause serious bleeding problems. This medicine could be in pill form or as a shot (injection). If a shot is necessary, your doctor will tell you how to do this. · Be safe with medicines. Take pain medicines exactly as directed. ¨ If the doctor gave you a prescription medicine for pain, take it as prescribed. ¨ If you are not taking a prescription pain medicine, ask your doctor if you can take an over-the-counter medicine. ¨ Plan to take your pain medicine 30 minutes before exercises. It is easier to prevent pain before it starts than to stop it once it has started. · If you think your pain medicine is making you sick to your stomach: 
¨ Take your medicine after meals (unless your doctor has told you not to). ¨ Ask your doctor for a different pain medicine. · If your doctor prescribed antibiotics, take them as directed. Do not stop taking them just because you feel better. You need to take the full course of antibiotics. Incision care · You will have a bandage over the cut (incision) and staples or stitches. Take the bandage off when your doctor says it is okay. · Your doctor will remove the staples or stitches 10 days to 3 weeks after the surgery and replace them with strips of tape. Leave the tape on for a week or until it falls off. Exercise · Your rehab program will give you a number of exercises to do to help you get back your knee's range of motion and strength. Always do them as your therapist tells you. Ice and elevation · For pain and swelling, put ice or a cold pack on the area for 10 to 20 minutes at a time. Put a thin cloth between the ice and your skin. Other instructions · Continue to wear your support stockings as your doctor says. These help to prevent blood clots. The length of time that you will have to wear them depends on your activity level and the amount of swelling. · Wear medical alert jewelry that says you may need antibiotics before any procedure, including dental work. You can buy this at most drugstores. · You have metal pieces in your knee. These may set off some airport metal detectors. Carry a medical alert card that says you have an artificial joint, just in case. Follow-up care is a key part of your treatment and safety. Be sure to make and go to all appointments, and call your doctor if you are having problems. It's also a good idea to know your test results and keep a list of the medicines you take. When should you call for help? Call 911 anytime you think you may need emergency care. For example, call if: 
· You passed out (lost consciousness). · You have severe trouble breathing. · You have sudden chest pain and shortness of breath, or you cough up blood. Call your doctor now or seek immediate medical care if: 
· You have signs of infection, such as: 
¨ Increased pain, swelling, warmth, or redness. ¨ Red streaks leading from the incision. ¨ Pus draining from the incision. ¨ A fever. · You have signs of a blood clot, such as: 
¨ Pain in your calf, back of the knee, thigh, or groin. ¨ Redness and swelling in your leg or groin. · Your incision comes open and begins to bleed, or the bleeding increases. · You have pain that does not get better after you take pain medicine. Watch closely for changes in your health, and be sure to contact your doctor if: 
· You do not have a bowel movement after taking a laxative. Where can you learn more? Go to http://paula-ranjan.info/. Enter T202 in the search box to learn more about \"Total Knee Replacement: What to Expect at Home. \" Current as of: March 21, 2017 Content Version: 11.3 © 4061-6056 [employer]. Care instructions adapted under license by [employer] (which disclaims liability or warranty for this information). If you have questions about a medical condition or this instruction, always ask your healthcare professional. [address] any warranty or liability for your use of this information. DISCHARGE SUMMARY from Nurse The following personal items are in your possession at time of discharge: 
 
Dental Appliances: None Visual Aid: Glasses Home Medications: Kept at bedside Jewelry: None Clothing: Footwear, Pants, Shirt Other Valuables: Cell Phone PATIENT INSTRUCTIONS: 
 
 
F-face looks uneven A-arms unable to move or move unevenly S-speech slurred or non-existent T-time-call 911 as soon as signs and symptoms begin-DO NOT go Back to bed or wait to see if you get better-TIME IS BRAIN. Warning Signs of HEART ATTACK Call 911 if you have these symptoms: 
? Chest discomfort. Most heart attacks involve discomfort in the center of the chest that lasts more than a few minutes, or that goes away and comes back. It can feel like uncomfortable pressure, squeezing, fullness, or pain. ? Discomfort in other areas of the upper body. Symptoms can include pain or discomfort in one or both arms, the back, neck, jaw, or stomach. ? Shortness of breath with or without chest discomfort. ? Other signs may include breaking out in a cold sweat, nausea, or lightheadedness. Don't wait more than five minutes to call [address]! Fast action can save your life. Calling 911 is almost always the fastest way to get lifesaving treatment.  Emergency Medical Services staff can begin treatment when they arrive  up to an hour sooner than if someone gets to the hospital by car. The discharge information has been reviewed with the {PATIENT PARENT GUARDIAN:55513}. The {PATIENT PARENT GUARDIAN:73152} verbalized understanding. Discharge medications reviewed with the {Dishcarge meds reviewed NCDZ:53779} and appropriate educational materials and side effects teaching were provided. Discharge Orders Procedure Order Date Status Priority Quantity Spec Type Associated Dx CALL YOUR DOCTOR For: Temperature greater than 100.4., Severe uncontrolled pain. , Redness, tenderness, or signs of infection. , Persistant nausea and vomiting., Persistant dizziness or light-headedness. , Hives, Difficulty breathing, headache, or vi. .. 07/27/17 0729 Normal Routine 1  Status post total right knee replacement [[id number]] Questions: For:  Temperature greater than 100.4. For:  Severe uncontrolled pain. For:  Redness, tenderness, or signs of infection. For:  Persistant nausea and vomiting. For:  Persistant dizziness or light-headedness. For:  Promise Dill For:  Difficulty breathing, headache, or visual disturbances. ACTIVITY AFTER DISCHARGE Patient should: Restrict driving, Restrict lifting Weight bear as tolerated with walker 07/27/17 0729 Normal Routine 1  Status post total right knee replacement [[id number]] Comments:  Weight bear as tolerated with walker Questions: Patient should:  Restrict driving Patient should:  Restrict lifting DIET REGULAR 1g Na; No added salt 07/27/17 0729 Normal Routine 1  Status post total right knee replacement [[id number]] Questions: Sodium Amt:  1g Na Additional options:  No added salt DRESSING, CHANGE SPECIFY 07/27/17 0729 Normal Routine 1  Status post total right knee replacement [[id number]]   Comments:  Dressing to be changed by home health using sterile technique. If staples are present they are to be removed 10 days post surgery and steri strips applied REFERRAL TO HOME HEALTH 07/27/17 0729 Normal Routine 1  Status post total right knee replacement [[id number]] REFERRAL TO PHYSICAL THERAPY 07/27/17 0729 Normal Routine 1  Status post total right knee replacement [[id number]] Comments:  Arrange for Home Physical Therapy, [facility] ACO Transitions of Care Introducing [employer] [address] Nadira Logan offers a voluntary care coordination program to provide high quality service and care to [facility] fee-for-service beneficiaries. Kassandra Garcia was designed to help you enhance your health and well-being through the following services: ? Transitions of Care  support for individuals who are transitioning from one care setting to another (example: Hospital to home). ? Chronic and Complex Care Coordination  support for individuals and caregivers of those with serious or chronic illnesses or with more than one chronic (ongoing) condition and those who take a number of different medications. If you meet specific medical criteria, a [address] [place] [address] Rd may call you directly to coordinate your care with your primary care physician and your other care providers. For questions about the [facility] programs, please, contact your physicians office. For general questions or additional information about Accountable Care Organizations: 
Please visit www.medicare.gov/acos. html or call 1-800-MEDICARE (3-510.705.5053) TTY users should call 3-197.607.4098. Introducing hospitals & HEALTH SERVICES! Wanda Rogel introduces [employer] patient portal. Now you can access parts of your medical record, email your doctor's office, and request medication refills online. 1. In your internet browser, go to [email or web address] 2. Click on the First Time User? Click Here link in the Sign In box. You will see the New Member Sign Up page. 3. Enter your Keecker Access Code exactly as it appears below. You will not need to use this code after youve completed the sign-up process. If you do not sign up before the expiration date, you must request a new code. · Keecker Access Code: [email or web address] Expires: 9/25/2017  3:54 PM 
 
4. Enter the last four digits of your Social Security Number (xxxx) and Date of Birth (mm/dd/yyyy) as indicated and click Submit. You will be taken to the next sign-up page. 5. Create a Keecker ID. This will be your Keecker login ID and cannot be changed, so think of one that is secure and easy to remember. 6. Create a Keecker password. You can change your password at any time. 7. Enter your Password Reset Question and Answer. This can be used at a later time if you forget your password. 8. Enter your e-mail address. You will receive e-mail notification when new information is available in [address] 9. Click Sign Up. You can now view and download portions of your medical record. 10. Click the Download Summary menu link to download a portable copy of your medical information. If you have questions, please visit the Frequently Asked Questions section of the Keecker website. Remember, K[employer]cker is NOT to be used for urgent needs. For medical emergencies, dial 911. Now available from your iPhone and Android! General Information Please provide this summary of care documentation to your next provider. Patient Signature:  ____________________________________________________________ Date:  ____________________________________________________________  
  
Prosper Brake Provider Signature:  ____________________________________________________________ Date:  ____________________________________________________________

## 2017-07-26 NOTE — PROGRESS NOTES
Care Management Interventions  Mode of Transport at Discharge: Self  Transition of Care Consult (CM Consult): Other  Discharge Durable Medical Equipment: No  Physical Therapy Consult: Yes  Occupational Therapy Consult: Yes  Current Support Network: Lives with Spouse  Confirm Follow Up Transport: Family  Plan discussed with Pt/Family/Caregiver: Yes  Freedom of Choice Offered: Yes  Discharge Location  Discharge Placement: Home with outpatient services    Patient is a 80y.o. year old female admitted for Right TKA . Patient lives with Her spouse and plans to return home on discharge. Order received to home therapy. Patient without preference towards agency. Referral sent to [facility] which was arranged by MD office. Patient denies any equipment needs as she has a walker and bedside commode. Will follow until discharge.   Betty Sebastian

## 2017-07-26 NOTE — ANESTHESIA PREPROCEDURE EVALUATION
Anesthetic History   No history of anesthetic complications            Review of Systems / Medical History  Patient summary reviewed and pertinent labs reviewed    Pulmonary  Within defined limits                 Neuro/Psych   Within defined limits           Cardiovascular              Hyperlipidemia    Exercise tolerance: >4 METS     GI/Hepatic/Renal           Hiatal hernia     Endo/Other      Hypothyroidism  Morbid obesity and arthritis     Other Findings   Comments: IBS  Anxiety  sarcoidosis           Physical Exam    Airway  Mallampati: II  TM Distance: 4 - 6 cm  Neck ROM: normal range of motion   Mouth opening: Normal     Cardiovascular  Regular rate and rhythm,  S1 and S2 normal,  no murmur, click, rub, or gallop  Rhythm: regular  Rate: normal         Dental  No notable dental hx       Pulmonary  Breath sounds clear to auscultation               Abdominal  GI exam deferred       Other Findings            Anesthetic Plan    ASA: 3  Anesthesia type: spinal      Post-op pain plan if not by surgeon: peripheral nerve block single    Induction: Intravenous  Anesthetic plan and risks discussed with: Patient      Pt has outbreak (herpetic) in sacral region since stopping lysine.  It is not in the area for the spinal.

## 2017-07-26 NOTE — PERIOP NOTES
Betadine lavage:  17.5cc of betadine lot # [id number], exp. Date 02/2019,  in 500cc of . 9NS Lot # [id number] exp.  Date 04/01/2020

## 2017-07-26 NOTE — PROGRESS NOTES
TRANSFER - IN REPORT:    Verbal report received from Roland Willett RN on Jodi Agrawal  being received from PACU for routine post - op      Report consisted of patients Situation, Background, Assessment and   Recommendations(SBAR). Information from the following report(s) SBAR, Intake/Output and MAR was reviewed with the receiving nurse. Opportunity for questions and clarification was provided. Assessment completed upon patients arrival to unit and care assumed. Oriented to room, bed controls, and how to order meals. Family in room. No complaints. Instructed to use IS. Yellow gripper socks to feet and instructed not to get up without staff to assist. Instructed to call for pain medication when needed. Aquacel dry and intact to R knee with iceman. TEDs and SCDs to LEs. Moving feet.

## 2017-07-26 NOTE — PROGRESS NOTES
07/26/17 1705   Oxygen Therapy   O2 Sat (%) 97 %   Pulse via Oximetry 70 beats per minute   O2 Device Nasal cannula   O2 Flow Rate (L/min) 2.5 l/min  (weaned to 1- pt a little sleepy, o2 left on)   Patient encouraged to do IS every hour while awake-patient agreed and demonstrated. No shortness of breath or distress noted. BS are clear b/l.    [facility] notes reviewed- continuous sat # 8 ordered HS

## 2017-07-26 NOTE — ANESTHESIA PROCEDURE NOTES
Peripheral Block    Start time: 7/26/2017 10:12 AM  End time: 7/26/2017 10:14 AM  Performed by: Kalli Mary  Authorized by: Kalli Mary       Pre-procedure: Indications: at surgeon's request and post-op pain management    Preanesthetic Checklist: patient identified, risks and benefits discussed, site marked, timeout performed, anesthesia consent given and patient being monitored    Timeout Time: 10:12          Block Type:   Block Type:   Adductor canal  Laterality:  Right  Monitoring:  Standard ASA monitoring, continuous pulse ox, frequent vital sign checks, heart rate and oxygen  Injection Technique:  Single shot  Procedures: ultrasound guided and nerve stimulator    Prep: chlorhexidine    Needle Type:  Stimuplex  Needle Gauge:  22 G  Needle Localization:  Nerve stimulator and ultrasound guidance  Motor Response: minimal motor response >0.4 mA    Medication Injected:  0.2%  ropivacaine  Volume (mL):  40  Volume (mL):  10    Assessment:  Number of attempts:  1  Injection Assessment:  Local visualized surrounding nerve on ultrasound, negative aspiration for CSF, negative aspiration for blood, no paresthesia, no intravascular symptoms and ultrasound image on chart  Patient tolerance:  Patient tolerated the procedure well with no immediate complications  20 ml

## 2017-07-26 NOTE — CONSULTS
MD Joe   Medical Director  [address] [place] [address], [address] [facility]  Tel: [phone]     Physical Medicine & Rehabilitation Note-consult    Patient: David Plaza MRN: [id number]  SSN: [id number]    YOB: 1945  Age: 80 y.o. Sex: female      Admit Date: 7/26/2017  Admitting Physician: Amil Meckel., MD    Medical Decision Making/Plan/Recommend: Gait impairment and functional deficits s/p R TKA. She tolerated post op rehab treatment well so far with no major barriers to progress. She is expected to make continued functional gains. Continued rehab at home via [facility] PT would be reasonable and necessary. Patient will continue PT, OT to focus on right TKA ROM, strengthening, mobility, transfers, and gait training. Will follow progress. Chief Complaint : Gait dysfunction secondary to below. Admit Diagnosis: Unilateral primary osteoarthritis, right knee [M17.11]  right total knee arthroplasty   7/26/2017  Pain  DVT risk  Osteoarthritis   IBS (irritable bowel syndrome)  Sarcoidosis ([address]  Post op hemorrhagic anemia  Acute Rehab Dx:  Gait impairment  Mobility and ambulation deficits  Self Care/ADL deficits    Medical Dx:  Past Medical History:   Diagnosis Date    Adverse effect of anesthesia     delayed awakening    Anxiety 11/26/2012    Bilateral tinnitus     Breast tenderness 11/26/2012    Environmental and seasonal allergies     Former smoker     Headache 11/26/2012    Hematuria, microscopic 11/26/2012    per patient Chronic-last seen by urology 2009.      Hiatal hernia     Hyperglycemia 11/26/2012    Hemoglobin A1c 7 on 3/17/17, non-fasting glucose 177 on 7/11/17    Hyperlipidemia 11/26/2012    IBS (irritable bowel syndrome)     Osteoarthritis 11/26/2012    Sarcoidosis ([address] 11/26/2012    Thyroid nodule 11/26/2012    3 lesions     Tortuous aorta ([address]     per patient seen at [facility] \"several years ago\"    Unilateral primary osteoarthritis, right knee     Vitamin D deficiency 2012    Weight gain 2012     Subjective:     Date of Evaluation:  2017    HPI: Chary Sanchez is a 80 y.o. female patient at [address] [place] [address] who was admitted on 2017  by Bradly Duane., MD with below mentioned medical history, is being seen for Physical Medicine and Rehabilitation consult. Chary Sanchez who has had debilitating right knee pain due to end stage DJD is s/p a right total knee arthroplasty per Dr. Bradly Duane., MD on 2017. The patient's post operative course has been medically uncomplicated. We are consulted to assist with rehab needs and placement. Patient's weight bearing status is to be WBAT RLE. She is starting to participate in acute PT and OT, and is making steady functional gains. No major barriers are noted. Chary Sanchez is seen and examined today. Medical Records reviewed. Patient has also bad, painful left knee due to DJSD. Pt states she is otherwise without any major impairments, would be independent. Prior Level of Function/Work/Activity:  Ambulating with single crutch. Current Level of Function:  bed mobility - CGA, transfers - CG A, decreased balance , ambulation -  140' with RW and SBA. Family History   Problem Relation Age of Onset    Stroke Mother     Thyroid Disease Mother     Anxiety Mother     Depression Mother     Heart Disease Father     High Cholesterol Father     Diabetes Father     Diabetes Brother     Diabetes Brother       Social History   Substance Use Topics    Smoking status: Former Smoker     Quit date: 1977    Smokeless tobacco: Never Used    Alcohol use Yes      Comment: rarely      Past Surgical History:   Procedure Laterality Date    ENDOSCOPY, COLON, DIAGNOSTIC      HX  SECTION      HX CHOLECYSTECTOMY      HX COLONOSCOPY  2012     repeat in 10 yrs.     HX TONSILLECTOMY  1964    HX TUMOR REMOVAL Left     pappilloma tumor left breast       Prior to Admission medications    Medication Sig Start Date End Date Taking? Authorizing Provider   aspirin delayed-release 325 mg tablet Take 1 Tab by mouth every twelve (12) hours every twelve (12) hours. Indications: Thrombosis Prevention after PCI 17  Yes Lyric Amanda MD   HYDROmorphone (DILAUDID) 2 mg tablet Take 1 Tab by mouth every four (4) hours as needed. Max Daily Amount: 12 mg. 17  Yes Lyric Amanda MD   TURMERIC ROOT EXTRACT PO Take 900 mg by mouth daily. Historical Provider   vitamin D3-vitamin K2, MK4, (K2 PLUS D3) 1,000-100 unit-mcg tab Take  by mouth daily. Historical Provider   selenium 100 mcg tab Take  by mouth daily. Historical Provider   Vit D3 & K-Berberine-Hops 175-819- unit-mcg-mg-mg tab Take  by mouth daily. Historical Provider   zinc 50 mg tab tablet Take 50 mg by mouth daily. Historical Provider   cholecalciferol, vitamin D3, (VITAMIN D3) 2,000 unit tab Take 4,000 Units by mouth daily. 14   Sabra Sanchez MD   MAGNESIUM PO Take 250 mg by mouth daily. Historical Provider   Omega-3-DHA-EPA-Fish Oil 1,000 (120-180) mg cap Take  by mouth. Historical Provider     No Known Allergies     Review of Systems: +right knee pain, +antalgic gait. Denies chest pain, shortness of breath, cough, headache, visual problems, abdominal pain, dysurea, calf pain. Pertinent positives are as noted in the medical records and unremarkable otherwise. Objective:     Vitals:  Blood pressure 112/58, pulse 61, temperature 96.2 °F (35.7 °C), resp. rate 16, height 5' 1\" (1.549 m), weight 171 lb (77.6 kg), SpO2 95 %, not currently breastfeeding. Temp (24hrs), Av.8 °F (35.4 °C), Min:95.2 °F (35.1 °C), Max:97.2 °F (36.2 °C)    BMI (calculated): 32.3 (17 0730)   Intake and Output:   1901 -  0700  In: 7907 [P.O.:840;  I.V.:3053]  Out: 2150 [Urine:1950]    Physical Exam:   General: Alert and age appropriately oriented. No acute cardio respiratory distress. HEENT: Normocephalic, no conjunctival pallor, no scleral icterus  Oral mucosa moist without cyanosis, no JVD   Lungs: Clear to auscultation bilaterally. Respiration even and unlabored   Heart: Regular rate and rhythm, S1, S2   No  murmurs, clicks, rub or gallops   Abdomen: Soft, non-tender, non-distended. Genitourinary: defered   Neuromuscular:      Grossly no focal motor deficits. Right knee extension strong  Right ankle dorsiflexion 5/5  Right ankle plantarflexion 5/5  No sensory deficits distally BLE to soft touch. Skin/extremity: No calf tenderness BLE. No rashes, no marginal erythema.                                                                                          Labs/Studies:  Recent Results (from the past 72 hour(s))   TYPE & SCREEN    Collection Time: 07/26/17  9:00 AM   Result Value Ref Range    Crossmatch Expiration 07/29/2017     ABO/Rh(D) Dedra Shade POSITIVE     Antibody screen NEG    GLUCOSE, POC    Collection Time: 07/26/17  9:17 AM   Result Value Ref Range    Glucose (POC) 134 (H) 65 - 100 mg/dL   HEMOGLOBIN    Collection Time: 07/26/17  7:47 PM   Result Value Ref Range    HGB 12.8 11.7 - 15.4 g/dL   GLUCOSE, POC    Collection Time: 07/26/17 11:00 PM   Result Value Ref Range    Glucose (POC) 224 (H) 65 - 100 mg/dL   HEMOGLOBIN    Collection Time: 07/27/17  4:26 AM   Result Value Ref Range    HGB 12.1 11.7 - 97.7 g/dL   METABOLIC PANEL, BASIC    Collection Time: 07/27/17  4:26 AM   Result Value Ref Range    Sodium 138 136 - 145 mmol/L    Potassium 4.8 3.5 - 5.1 mmol/L    Chloride 106 98 - 107 mmol/L    CO2 25 21 - 32 mmol/L    Anion gap 7 7 - 16 mmol/L    Glucose 201 (H) 65 - 100 mg/dL    BUN 14 8 - 23 MG/DL    Creatinine 0.87 0.6 - 1.0 MG/DL    GFR est AA >60 >60 ml/min/1.73m2    GFR est non-AA >60 >60 ml/min/1.73m2    Calcium 8.4 8.3 - 10.4 MG/DL       Functional Assessment:  Reviewed participation and progress in therapies  Gross Assessment  AROM: Generally decreased, functional (R TKA) (07/26/17 1525)  Strength: Generally decreased, functional (R TKA) (07/26/17 1525)   Bed Mobility  Supine to Sit: Minimum assistance (07/26/17 1525)  Sit to Supine: Minimum assistance (07/26/17 1525)   Balance  Sitting: Intact (07/27/17 1100)  Standing: Intact; With support (07/27/17 1100)               Bed/Mat Mobility  Supine to Sit: Minimum assistance (07/26/17 1525)  Sit to Supine: Minimum assistance (07/26/17 1525)  Sit to Stand: Contact guard assistance (07/27/17 1100)     Ambulation:  Gait  Speed/Angelia: Slow (07/27/17 1100)  Step Length: Left shortened (07/27/17 1100)  Stance: Right decreased (07/27/17 1100)  Gait Abnormalities: Antalgic (07/27/17 1100)  Ambulation - Level of Assistance: Stand-by asssistance (07/27/17 1100)  Distance (ft): 140 Feet (ft) (07/27/17 1100)  Assistive Device: Walker, rolling (07/27/17 1100)  Interventions: Safety awareness training (07/27/17 1100)  Duration: 8 Minutes (07/27/17 1100)    Impression/Plan:     Principal Problem:    Status post total right knee replacement (7/26/2017)    Active Problems:    Osteoarthritis of right knee (7/26/2017)        Current Facility-Administered Medications   Medication Dose Route Frequency Provider Last Rate Last Dose    0.9% sodium chloride infusion  100 mL/hr IntraVENous CONTINUOUS MARIA G Richards 100 mL/hr at 07/26/17 1902 100 mL/hr at 07/26/17 1902    sodium chloride (NS) flush 5-10 mL  5-10 mL IntraVENous Q8H Amie Matthews, [address]        sodium chloride (NS) flush 5-10 mL  5-10 mL IntraVENous PRN MARIA G Richards        acetaminophen (TYLENOL) tablet 1,000 mg  1,000 mg Oral Q6H MARIA G Richards   1,000 mg at 07/27/17 0830    celecoxib (CELEBREX) capsule 200 mg  200 mg Oral Q12H MARIA G Richards   200 mg at 07/26/17 2205    HYDROmorphone (DILAUDID) tablet 2 mg  2 mg Oral Q4H PRN MARIA G Richards   2 mg at 07/27/17 0636    HYDROmorphone (PF) (DILAUDID) injection 1 mg  1 mg IntraVENous Q3H PRN MARIA G Jacques        naloxone [facility]) injection 0.2-0.4 mg  0.2-0.4 mg IntraVENous Q10MIN PRN MARIA G Jacques        dexamethasone (DECADRON) injection 10 mg  10 mg IntraVENous ONCE Patricia Philip, [address]        ondansetron [facility]) injection 4 mg  4 mg IntraVENous Q4H PRN MARIA G Jacques   4 mg at 07/27/17 0751    diphenhydrAMINE (BENADRYL) capsule 25 mg  25 mg Oral Q4H PRN MARIA G Jacques        senna-docusate (PERICOLACE) 8.6-50 mg per tablet 2 Tab  2 Tab Oral DAILY Patricia Philip [address]        aspirin delayed-release tablet 325 mg  325 mg Oral Q12H Patricia Philip [address]   325 mg at 07/27/17 0830    insulin lispro (HUMALOG) injection   SubCUTAneous AC&HS Josepha Crigler, MD   4 Units at 07/26/17 6007        Recommendations:   Plan for home discharge with [place] PT. Continue Acute Rehab Program.  Coordination of rehab/medical care. Counseling of Physical Medicine & Rehab care issues management. Rehabilitation Management/ Medical Management: 1. Devices:Walkers, Type: Rolling Walker  2. Consult:Rehab team including PT, OT,  and . 3. Disposition Rehab-discussed with patient. 4. Thigh-high or knee-high PAYTON's when out of bed. 5. DVT Prophylaxis - aspirin 325mg bid x 30days. 6. Incentive spirometer Q1H while awake  7. Post op hemorrhagic anemia-   monitor. hgb 12.8->12.1  8. Activity: WBAT RLE  9. Planned Labs: CBC,BMP  10. Pain Control: Controlled with scheduled tylenol, celebrex and  PRN meds. Continue current management. 11. Wound Care: Keep right TKA wound clean and dry and reinforce dressing PRN. May remove Aquacel 1 week post op ad replace with new one. Remove staples 12-14 post surgery, when incision appears appropriately closed and apply benzoin and 1/2\" steristrips. Follow up with Dr Saira Hernandes  2 weeks after discharge from rehab. Follow up with ORTHO per instructions.       Thank you for the opportunity to participate in the care of this patient.     Signed By: Gauri Monson MD     July 27, 2017

## 2017-07-26 NOTE — CONSULTS
History and Physical    Subjective:     Lonny Dennison is a 80 y.o. female seen post operatively from a right TKA. She has non insulin dependent diabetes with A1C in March of 7% and fasting BS this morning of 134. Takes herbal medicines at home. No chest pain, sob, nausea at this time. Past Medical History:   Diagnosis Date    Adverse effect of anesthesia     delayed awakening    Anxiety 2012    Bilateral tinnitus     Breast tenderness 2012    Environmental and seasonal allergies     Former smoker     Headache 2012    Hematuria, microscopic 2012    per patient Chronic-last seen by urology .  Hiatal hernia     Hyperglycemia 2012    Hemoglobin A1c 7 on 3/17/17, non-fasting glucose 177 on 17    Hyperlipidemia 2012    IBS (irritable bowel syndrome)     Osteoarthritis 2012    Sarcoidosis ([address] 2012    Thyroid nodule 2012    3 lesions     Tortuous aorta ([address]     per patient seen at [facility] \"several years ago\"    Unilateral primary osteoarthritis, right knee     Vitamin D deficiency 2012    Weight gain 2012      Past Surgical History:   Procedure Laterality Date    ENDOSCOPY, COLON, DIAGNOSTIC      HX  SECTION      HX CHOLECYSTECTOMY      HX COLONOSCOPY  2012     repeat in 10 yrs.  HX TONSILLECTOMY  1964    HX TUMOR REMOVAL Left     pappilloma tumor left breast      Family History   Problem Relation Age of Onset    Stroke Mother     Thyroid Disease Mother     Anxiety Mother     Depression Mother     Heart Disease Father     High Cholesterol Father     Diabetes Father     Diabetes Brother     Diabetes Brother       Social History   Substance Use Topics    Smoking status: Former Smoker     Quit date: 1977    Smokeless tobacco: Never Used    Alcohol use Yes      Comment: rarely        Prior to Admission medications    Medication Sig Start Date End Date Taking?  Authorizing Provider   TURMERIC ROOT EXTRACT PO Take 900 mg by mouth daily. Historical Provider   vitamin D3-vitamin K2, MK4, (K2 PLUS D3) 1,000-100 unit-mcg tab Take  by mouth daily. Historical Provider   selenium 100 mcg tab Take  by mouth daily. Historical Provider   Vit D3 & K-Berberine-Hops 825-379- unit-mcg-mg-mg tab Take  by mouth daily. Historical Provider   zinc 50 mg tab tablet Take 50 mg by mouth daily. Historical Provider   cholecalciferol, vitamin D3, (VITAMIN D3) 2,000 unit tab Take 4,000 Units by mouth daily. 7/24/14   Taniya Castro MD   MAGNESIUM PO Take 250 mg by mouth daily. Historical Provider   Omega-3-DHA-EPA-Fish Oil 1,000 (120-180) mg cap Take  by mouth. Historical Provider     No Known Allergies     Review of Systems:  A comprehensive review of systems was negative except for that written in the History of Present Illness. Objective: Intake and Output:    07/26 0701 - 07/26 1900  In: -   Out: 500 [Urine:300]       Physical Exam:   General; awake, alert, oriented, no acute distress  Eyes; non icteric, EOMI  Neck; supple  CV: RRR  Pulm; non labored, normal effort  Abd; soft, non distended     Data Review:   Recent Results (from the past 24 hour(s))   TYPE & SCREEN    Collection Time: 07/26/17  9:00 AM   Result Value Ref Range    Crossmatch Expiration 07/29/2017     ABO/Rh(D) [place] Geovanna POSITIVE     Antibody screen NEG    GLUCOSE, POC    Collection Time: 07/26/17  9:17 AM   Result Value Ref Range    Glucose (POC) 134 (H) 65 - 100 mg/dL           Assessment:     Principal Problem:    Osteoarthritis of right knee (7/26/2017)        Plan:     Non insulin dependent type 2 diabetes:  Start SSI ACHS while in house and resume routine meds on discharge. Please call with any questions/concerns.     Signed By: Melany Delgado MD     July 26, 2017

## 2017-07-26 NOTE — PERIOP NOTES
TRANSFER - IN REPORT:    Verbal report received from [address] [place] [address]  on [facility]  being received from [facility]  for routine progression of care      Report consisted of patients Situation, Background, Assessment and   Recommendations(SBAR). Information from the following report(s) SBAR, Kardex, Intake/Output and MAR was reviewed with the receiving nurse. Opportunity for questions and clarification was provided. Assessment completed upon patients arrival to unit and care assumed.

## 2017-07-27 LAB
ANION GAP BLD CALC-SCNC: 7 MMOL/L (ref 7–16)
BUN SERPL-MCNC: 14 MG/DL (ref 8–23)
CALCIUM SERPL-MCNC: 8.4 MG/DL (ref 8.3–10.4)
CHLORIDE SERPL-SCNC: 106 MMOL/L (ref 98–107)
CO2 SERPL-SCNC: 25 MMOL/L (ref 21–32)
CREAT SERPL-MCNC: 0.87 MG/DL (ref 0.6–1)
GLUCOSE BLD STRIP.AUTO-MCNC: 161 MG/DL (ref 65–100)
GLUCOSE BLD STRIP.AUTO-MCNC: 170 MG/DL (ref 65–100)
GLUCOSE BLD STRIP.AUTO-MCNC: 193 MG/DL (ref 65–100)
GLUCOSE SERPL-MCNC: 201 MG/DL (ref 65–100)
HGB BLD-MCNC: 12.1 G/DL (ref 11.7–15.4)
MM INDURATION POC: 0 MM (ref 0–5)
POTASSIUM SERPL-SCNC: 4.8 MMOL/L (ref 3.5–5.1)
PPD POC: NORMAL NEGATIVE
SODIUM SERPL-SCNC: 138 MMOL/L (ref 136–145)

## 2017-07-27 PROCEDURE — 36415 COLL VENOUS BLD VENIPUNCTURE: CPT | Performed by: PHYSICIAN ASSISTANT

## 2017-07-27 PROCEDURE — 74011250636 HC RX REV CODE- 250/636: Performed by: PHYSICIAN ASSISTANT

## 2017-07-27 PROCEDURE — 77010033678 HC OXYGEN DAILY

## 2017-07-27 PROCEDURE — 94760 N-INVAS EAR/PLS OXIMETRY 1: CPT

## 2017-07-27 PROCEDURE — 80048 BASIC METABOLIC PNL TOTAL CA: CPT | Performed by: PHYSICIAN ASSISTANT

## 2017-07-27 PROCEDURE — 94762 N-INVAS EAR/PLS OXIMTRY CONT: CPT

## 2017-07-27 PROCEDURE — 97535 SELF CARE MNGMENT TRAINING: CPT

## 2017-07-27 PROCEDURE — 82962 GLUCOSE BLOOD TEST: CPT

## 2017-07-27 PROCEDURE — 85018 HEMOGLOBIN: CPT | Performed by: PHYSICIAN ASSISTANT

## 2017-07-27 PROCEDURE — 97110 THERAPEUTIC EXERCISES: CPT

## 2017-07-27 PROCEDURE — 97150 GROUP THERAPEUTIC PROCEDURES: CPT

## 2017-07-27 PROCEDURE — 65270000029 HC RM PRIVATE

## 2017-07-27 PROCEDURE — 97116 GAIT TRAINING THERAPY: CPT

## 2017-07-27 PROCEDURE — 74011636637 HC RX REV CODE- 636/637: Performed by: INTERNAL MEDICINE

## 2017-07-27 PROCEDURE — 74011250637 HC RX REV CODE- 250/637: Performed by: PHYSICIAN ASSISTANT

## 2017-07-27 RX ORDER — ASPIRIN 325 MG
325 TABLET, DELAYED RELEASE (ENTERIC COATED) ORAL EVERY 12 HOURS
Qty: 70 TAB | Refills: 0 | Status: SHIPPED | OUTPATIENT
Start: 2017-07-27 | End: 2022-03-30 | Stop reason: ALTCHOICE

## 2017-07-27 RX ORDER — HYDROMORPHONE HYDROCHLORIDE 2 MG/1
2 TABLET ORAL
Qty: 40 TAB | Refills: 0 | Status: SHIPPED | OUTPATIENT
Start: 2017-07-27 | End: 2017-07-28

## 2017-07-27 RX ADMIN — HYDROMORPHONE HYDROCHLORIDE 2 MG: 2 TABLET ORAL at 06:36

## 2017-07-27 RX ADMIN — ONDANSETRON 4 MG: 2 INJECTION INTRAMUSCULAR; INTRAVENOUS at 06:27

## 2017-07-27 RX ADMIN — DIPHENHYDRAMINE HYDROCHLORIDE 25 MG: 25 CAPSULE ORAL at 22:06

## 2017-07-27 RX ADMIN — ACETAMINOPHEN 1000 MG: 500 TABLET, FILM COATED ORAL at 00:54

## 2017-07-27 RX ADMIN — INSULIN LISPRO 2 UNITS: 100 INJECTION, SOLUTION INTRAVENOUS; SUBCUTANEOUS at 16:38

## 2017-07-27 RX ADMIN — HYDROMORPHONE HYDROCHLORIDE 2 MG: 2 TABLET ORAL at 12:07

## 2017-07-27 RX ADMIN — HYDROMORPHONE HYDROCHLORIDE 2 MG: 2 TABLET ORAL at 16:31

## 2017-07-27 RX ADMIN — ONDANSETRON 4 MG: 2 INJECTION INTRAMUSCULAR; INTRAVENOUS at 18:46

## 2017-07-27 RX ADMIN — ASPIRIN 325 MG: 325 TABLET, DELAYED RELEASE ORAL at 08:30

## 2017-07-27 RX ADMIN — ACETAMINOPHEN 1000 MG: 500 TABLET, FILM COATED ORAL at 08:30

## 2017-07-27 RX ADMIN — CEFAZOLIN 2 G: 1 INJECTION, POWDER, FOR SOLUTION INTRAMUSCULAR; INTRAVENOUS; PARENTERAL at 03:20

## 2017-07-27 RX ADMIN — Medication 5 ML: at 15:00

## 2017-07-27 RX ADMIN — ACETAMINOPHEN 1000 MG: 500 TABLET, FILM COATED ORAL at 16:31

## 2017-07-27 RX ADMIN — INSULIN LISPRO 2 UNITS: 100 INJECTION, SOLUTION INTRAVENOUS; SUBCUTANEOUS at 12:12

## 2017-07-27 RX ADMIN — ASPIRIN 325 MG: 325 TABLET, DELAYED RELEASE ORAL at 22:06

## 2017-07-27 NOTE — PROGRESS NOTES
Problem: Mobility Impaired (Adult and Pediatric)  Goal: *Acute Goals and Plan of Care (Insert Text)  GOALS (1-4 days):  (1.)Ms. Lisy Barron will move from supine to sit and sit to supine in bed with SUPERVISION. (2.)Ms. Lisy Barron will transfer from bed to chair and chair to bed with SUPERVISION using the least restrictive device. (3.)Ms. Lisy Barron will ambulate with SUPERVISION for 200 feet with the least restrictive device. (4.)Ms. Lisy Barron will increase right knee ROM to 5°-80°.  ________________________________________________________________________________________________       PHYSICAL THERAPY JOINT CAMP TKA: Daily Note and PM 7/27/2017  INPATIENT: Hospital Day: 2  [place] / Product Type: Medicare /      NAME/AGE/GENDER: Iris Calero is a 80 y.o. female      PRIMARY DIAGNOSIS:  Unilateral primary osteoarthritis, right knee [M17.11]              Procedure(s) and Anesthesia Type:     * RIGHT KNEE ARTHROPLASTY TOTAL / DOMINIK/ FNB - Spinal (Right)  ICD-10: Treatment Diagnosis:        · Stiffness of Right Knee, Not elsewhere classified (M25.661)  · Difficulty in walking, Not elsewhere classified (R26.2)       ASSESSMENT:      Ms. Lisy Barron presents with decreased R LE strength and ROM and decreased independence with mobility s/p TKA. Pt. Did afternoon group therapy well again and ambulated further than this morning. This section established at most recent assessment   PROBLEM LIST (Impairments causing functional limitations):  1. Decreased Strength  2. Decreased ADL/Functional Activities  3. Decreased Transfer Abilities  4. Decreased Ambulation Ability/Technique  5. Decreased Balance    INTERVENTIONS PLANNED: (Benefits and precautions of physical therapy have been discussed with the patient.)  1. Bed Mobility  2. Gait Training  3. Home Exercise Program (HEP)  4. Therapeutic Exercise/Strengthening  5. Transfer Training  6. Range of Motion: active/assisted/passive  7.  Therapeutic Activities  8. Group Therapy      TREATMENT PLAN: Frequency/Duration: Follow patient BID   to address above goals. Rehabilitation Potential For Stated Goals: GOOD      RECOMMENDED REHABILITATION/EQUIPMENT: (at time of discharge pending progress): Continue Skilled Therapy and Home Health: Physical Therapy. HISTORY:   History of Present Injury/Illness (Reason for Referral):  R TKA 17  Past Medical History/Comorbidities:   Ms. Lucius Chand  has a past medical history of Adverse effect of anesthesia; Anxiety (2012); Bilateral tinnitus; Breast tenderness (2012); Environmental and seasonal allergies; Former smoker; Headache (2012); Hematuria, microscopic (2012); Hiatal hernia; Hyperglycemia (2012); Hyperlipidemia (2012); IBS (irritable bowel syndrome); Osteoarthritis (2012); Sarcoidosis ([facility] [address] (2012); Thyroid nodule (2012); Tortuous aorta ([facility] [address] Unilateral primary osteoarthritis, right knee; Vitamin D deficiency (2012); and Weight gain (2012). She also has no past medical history of Difficult intubation; Malignant hyperthermia due to anesthesia; Nausea & vomiting; or Pseudocholinesterase deficiency. Ms. Lucius Chand  has a past surgical history that includes cholecystectomy; endoscopy, colon, diagnostic; tonsillectomy ();  section (); colonoscopy (); and tumor removal (Left).   Social History/Living Environment:   Home Environment: Private residence  # Steps to Enter: 0  One/Two Story Residence: Two story, live on 1st floor  # of Interior Steps: 12  Interior Rails: Left  Living Alone: No  Support Systems: Spouse/Significant Other/Partner  Patient Expects to be Discharged to[de-identified] Private residence  Current DME Used/Available at Home: Cane, straight, Commode, bedside, Crutches, Walker, rolling  Tub or Shower Type: Tub/Shower combination  Prior Level of Function/Work/Activity:  Ambulating with single crutch   Number of Personal Factors/Comorbidities that affect the Plan of Care: 1-2: MODERATE COMPLEXITY   EXAMINATION:   Most Recent Physical Functioning:                RLE AROM  R Knee Flexion: 86  R Knee Extension: 10                    Transfers  Sit to Stand: Stand-by asssistance  Stand to Sit: Stand-by asssistance     Balance  Sitting: Intact  Standing: Intact; With support                Weight Bearing Status  Right Side Weight Bearing: As tolerated  Distance (ft): 174 Feet (ft)  Ambulation - Level of Assistance: Stand-by asssistance  Assistive Device: Walker, rolling  Speed/Angelia: Slow  Step Length: Left shortened  Stance: Right decreased  Gait Abnormalities: Antalgic  Interventions: Safety awareness training      Braces/Orthotics: none     Right Knee Cold  Type: Cryocuff       Body Structures Involved:  1. Joints  2. Muscles Body Functions Affected:  1. Movement Related Activities and Participation Affected:  1. Mobility  2. Self Care  3. Domestic Life  4. Community, Social and [place]   Number of elements that affect the Plan of Care: 4+: HIGH COMPLEXITY   CLINICAL PRESENTATION:   Presentation: Stable and uncomplicated: LOW COMPLEXITY   CLINICAL DECISION MAKIN [facility] Inpatient Short Form  How much difficulty does the patient currently have. .. Unable A Lot A Little None   1. Turning over in bed (including adjusting bedclothes, sheets and blankets)? [ ] 1   [ ] 2   [X] 3   [ ] 4   2. Sitting down on and standing up from a chair with arms ( e.g., wheelchair, bedside commode, etc.)   [ ] 1   [ ] 2   [X] 3   [ ] 4   3. Moving from lying on back to sitting on the side of the bed? [ ] 1   [ ] 2   [X] 3   [ ] 4   How much help from another person does the patient currently need. .. Total A Lot A Little None   4. Moving to and from a bed to a chair (including a wheelchair)? [ ] 1   [ ] 2   [X] 3   [ ] 4   5. Need to walk in hospital room? [ ] 1   [ ] 2   [X] 3   [ ] 4   6. Climbing 3-5 steps with a railing? [ ] 1   [ ] 2   [X] 3   [ ] 4   © 2007, Trustees of [address] [place] [address] [address] [address], under license to [employer]. All rights reserved       Score:  Initial: 18 Most Recent: X (Date: -- )     Interpretation of Tool:  Represents activities that are increasingly more difficult (i.e. Bed mobility, Transfers, Gait). Score 24 23 22-20 19-15 14-10 9-7 6       Modifier CH CI CJ CK CL CM CN         · Mobility - Walking and Moving Around:               - CURRENT STATUS:    CK - 40%-59% impaired, limited or restricted               - GOAL STATUS:           CJ - 20%-39% impaired, limited or restricted               - D/C STATUS:                       ---------------To be determined---------------  [place] / Product Type: Medicare /       Medical Necessity:     · Patient is expected to demonstrate progress in strength, range of motion, balance and coordination to increase independence with mobility and ADLs. · Patient demonstrates good rehab potential due to higher previous functional level. Reason for Services/Other Comments:  · Patient continues to require skilled intervention due to decreased R LE strength and ROM and decreased independence with mobility s/p TKA. Use of outcome tool(s) and clinical judgement create a POC that gives a: Clear prediction of patient's progress: LOW COMPLEXITY           Gait Training (15 Minutes):  Gait training to improve and/or restore physical functioning as related to mobility, strength and balance. Ambulated 174 Feet (ft) with Stand-by asssistance using a Walker, rolling and minimal Safety awareness training related to their turning to promote proper body mechanics. Therapeutic Exercise: (45 Minutes):  Exercises per grid below to improve mobility and strength. Required minimal verbal cues to promote proper body mechanics.        Date:  7/27/17 Date:   Date:     ACTIVITY/EXERCISE AM PM AM PM AM PM   GROUP THERAPY  []  [x]  []  []  []  []   Ankle Pumps 15 15       Quad Sets 15 15       Gluteal Sets 15 15       Hip ABd/ADduction 15 15       Straight Leg Raises 15 15       Knee Slides 15 15       Short Arc Quads 15 15       Long Arc Quads         Chair Slides  15                B = bilateral; AA = active assistive; A = active; P = passive             TREATMENT:   (In addition to Assessment/Re-Assessment sessions the following treatments were rendered)      Pre-treatment Symptoms/Complaints:  Patient reports some nausea. Pain: Initial: Pain Intensity 1: 5  Pain Location 1: Knee  Pain Orientation 1: Right  Pain Intervention(s) 1: Cold pack  Post Session:  5/10         Treatment/Session Assessment:         Response to Treatment:  Patient tolerated therapy assessment well and expect her to make good progress. Education:  [ ] Home Exercises  [ ] Fall Precautions  [ ] Hip Precautions [ ] D/C Instruction Review  [ ] Knee/Hip Prosthesis Review  [ ] Michelle Reveal Management/Safety [ ] Adaptive Equipment as Needed         Interdisciplinary Collaboration:   · Physical Therapist, Registered Nurse and Rehabilitation Attendant     After treatment position/precautions:   · Up in chair, Bed/Chair-wheels locked, Bed in low position, Call light within reach and Family at bedside     Compliance with Program/Exercises: compliant all of the time. Recommendations/Intent for next treatment session:  Treatment next visit will focus on increasing Ms. Kelley's independence with bed mobility, transfers, gait training, strength/ROM exercises, modalities for pain, and patient education.        Total Treatment Duration:  PT Patient Time In/Time Out  Time In: 1300  Time Out: [address]

## 2017-07-27 NOTE — PROGRESS NOTES
07/26/17 2025   Oxygen Therapy   O2 Sat (%) 97 %   Pulse via Oximetry 73 beats per minute   O2 Device Nasal cannula   O2 Flow Rate (L/min) 1 l/min   Incentive Spirometry Treatment   Actual Volume (ml) 800 ml   Number of Attempts 3   Patient instructed in the use of incentive spirometry. Good npc. oxinet #8 placed on pt at this time. Alarms set and functioning properly.

## 2017-07-27 NOTE — PROGRESS NOTES
2017         Post Op day: 1 Day Post-OpProcedure(s) (LRB):  RIGHT KNEE ARTHROPLASTY TOTAL / DOMINIK/ FNB (Right)      Admit Date: 2017  Admit Diagnosis: Unilateral primary osteoarthritis, right knee [M17.11]    LAB:    Recent Results (from the past 24 hour(s))   TYPE & SCREEN    Collection Time: 17  9:00 AM   Result Value Ref Range    Crossmatch Expiration 2017     ABO/Rh(D) Edilberto Stafford POSITIVE     Antibody screen NEG    GLUCOSE, POC    Collection Time: 17  9:17 AM   Result Value Ref Range    Glucose (POC) 134 (H) 65 - 100 mg/dL   HEMOGLOBIN    Collection Time: 17  7:47 PM   Result Value Ref Range    HGB 12.8 11.7 - 15.4 g/dL   GLUCOSE, POC    Collection Time: 17 11:00 PM   Result Value Ref Range    Glucose (POC) 224 (H) 65 - 100 mg/dL   HEMOGLOBIN    Collection Time: 17  4:26 AM   Result Value Ref Range    HGB 12.1 11.7 - 96.2 g/dL   METABOLIC PANEL, BASIC    Collection Time: 17  4:26 AM   Result Value Ref Range    Sodium 138 136 - 145 mmol/L    Potassium 4.8 3.5 - 5.1 mmol/L    Chloride 106 98 - 107 mmol/L    CO2 25 21 - 32 mmol/L    Anion gap 7 7 - 16 mmol/L    Glucose 201 (H) 65 - 100 mg/dL    BUN 14 8 - 23 MG/DL    Creatinine 0.87 0.6 - 1.0 MG/DL    GFR est AA >60 >60 ml/min/1.73m2    GFR est non-AA >60 >60 ml/min/1.73m2    Calcium 8.4 8.3 - 10.4 MG/DL     Vital Signs:    Patient Vitals for the past 8 hrs:   BP Temp Pulse Resp SpO2   17 0428 104/58 95.5 °F (35.3 °C) 63 16 97 %   17 0052 116/62 95.4 °F (35.2 °C) 63 16 97 %     Temp (24hrs), Av.8 °F (35.4 °C), Min:95.2 °F (35.1 °C), Max:97.2 °F (36.2 °C)    Body mass index is 32.31 kg/(m^2).   Pain Control:   Pain Assessment  Pain Scale 1: Numeric (0 - 10)  Pain Intensity 1: 5  Pain Location 1: Knee  Pain Intervention(s) 1: Medication (see MAR), Cold pack    Subjective: Doing well, No complaints, No SOB, No Chest Pain, No Nausea or Vomitting     Objective: Vital Signs are Stable, No Acute Distress, Alert and Oriented, Dressing is Dry,  Neurovascular exam is normal.       PT/OT:            Assistive Device: Walker (comment)                Weight Bearing Status: WBAT    Meds:  [unfilled]  [unfilled]  [unfilled]    Assessment:   Patient Active Problem List   Diagnosis Code    Breast tenderness N64.4    Anxiety F41.9    IBS (irritable bowel syndrome) K58.9    Headache R51    Hyperglycemia R73.9    Sarcoidosis ([address] D86.9    Osteoarthritis M19.90    Hematuria, microscopic R31.29    Thyroid nodule E04.1    Vitamin D deficiency E55.9    Bilateral tinnitus H93.13    Osteoarthritis of right knee M17.11    Status post total right knee replacement Z96.651             Plan: Continue Physical Therapy, Monitor  Hbg, home tomorrow.         Signed By: Munir Rebollar MD

## 2017-07-27 NOTE — PROGRESS NOTES
Was up to BR and back to recliner. Iceman to knee. Medicated for pain again with dilaudid po. No further changes.

## 2017-07-27 NOTE — PROGRESS NOTES
Aquacel dry and intact to R knee with iceman. TEDs and SCDs on. No complaints. Family member in room. NV checks WDL.

## 2017-07-27 NOTE — PROGRESS NOTES
Problem: Mobility Impaired (Adult and Pediatric)  Goal: *Acute Goals and Plan of Care (Insert Text)  GOALS (1-4 days):  (1.)Ms. Daniel Arciniega will move from supine to sit and sit to supine in bed with SUPERVISION. (2.)Ms. Daniel Arciniega will transfer from bed to chair and chair to bed with SUPERVISION using the least restrictive device. (3.)Ms. Daniel Arciniega will ambulate with SUPERVISION for 200 feet with the least restrictive device. (4.)Ms. Daniel Arciniega will increase right knee ROM to 5°-80°.  ________________________________________________________________________________________________       PHYSICAL THERAPY JOINT CAMP TKA: Daily Note and AM 7/27/2017  INPATIENT: Hospital Day: 2  [place] / Product Type: Medicare /      NAME/AGE/GENDER: Bahman Hernandez is a 80 y.o. female      PRIMARY DIAGNOSIS:  Unilateral primary osteoarthritis, right knee [M17.11]              Procedure(s) and Anesthesia Type:     * RIGHT KNEE ARTHROPLASTY TOTAL / DOMINIK/ FNB - Spinal (Right)  ICD-10: Treatment Diagnosis:        · Stiffness of Right Knee, Not elsewhere classified (M25.661)  · Difficulty in walking, Not elsewhere classified (R26.2)       ASSESSMENT:      Ms. Daniel Arciniega presents with decreased R LE strength and ROM and decreased independence with mobility s/p TKA. Pt. Tolerated this morning's treatment well. She ambulated further, and a moderate pain, but didn't complain. She did an excellent job at initiating on her own, ambulating with a more normalized gait. This section established at most recent assessment   PROBLEM LIST (Impairments causing functional limitations):  1. Decreased Strength  2. Decreased ADL/Functional Activities  3. Decreased Transfer Abilities  4. Decreased Ambulation Ability/Technique  5. Decreased Balance    INTERVENTIONS PLANNED: (Benefits and precautions of physical therapy have been discussed with the patient.)  1. Bed Mobility  2. Gait Training  3.  Home Exercise Program (HEP)  4. Therapeutic Exercise/Strengthening  5. Transfer Training  6. Range of Motion: active/assisted/passive  7. Therapeutic Activities  8. Group Therapy      TREATMENT PLAN: Frequency/Duration: Follow patient BID   to address above goals. Rehabilitation Potential For Stated Goals: GOOD      RECOMMENDED REHABILITATION/EQUIPMENT: (at time of discharge pending progress): Continue Skilled Therapy and Home Health: Physical Therapy. HISTORY:   History of Present Injury/Illness (Reason for Referral):  R TKA 17  Past Medical History/Comorbidities:   Ms. 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 She also has no past medical history of Difficult intubation; Malignant hyperthermia due to anesthesia; Nausea & vomiting; or Pseudocholinesterase deficiency. Ms. Lucius Chand  has a past surgical history that includes cholecystectomy; endoscopy, colon, diagnostic; tonsillectomy ();  section (); colonoscopy (); and tumor removal (Left).   Social History/Living Environment:   Home Environment: Private residence  # Steps to Enter: 0  One/Two Story Residence: Two story, live on 1st floor  # of Interior Steps: 16  Interior Rails: Left  Living Alone: No  Support Systems: Spouse/Significant Other/Partner  Patient Expects to be Discharged to[employer] residence  Current DME Used/Available at Home: Cane, straight, Commode, bedside, Crutches, Walker, rolling  Tub or Shower Type: Tub/Shower combination  Prior Level of Function/Work/Activity:  Ambulating with single crutch   Number of Personal Factors/Comorbidities that affect the Plan of Care: 1-2: MODERATE COMPLEXITY   EXAMINATION:   Most Recent Physical Functioning:                                     Transfers  Sit to Stand: Contact guard assistance  Stand to Sit: Stand-by asssistance     Balance  Sitting: Intact  Standing: Intact; With support                Weight Bearing Status  Right Side Weight Bearing: As tolerated  Distance (ft): 140 Feet (ft)  Ambulation - Level of Assistance: Stand-by asssistance  Assistive Device: Walker, rolling  Speed/Angelia: Slow  Step Length: Left shortened  Stance: Right decreased  Gait Abnormalities: Antalgic  Interventions: Safety awareness training      Braces/Orthotics: none     Right Knee Cold  Type: Cryocuff       Body Structures Involved:  1. Joints  2. Muscles Body Functions Affected:  1. Movement Related Activities and Participation Affected:  1. Mobility  2. Self Care  3. Domestic Life  4. Community, Social and [place]   Number of elements that affect the Plan of Care: 4+: HIGH COMPLEXITY   CLINICAL PRESENTATION:   Presentation: Stable and uncomplicated: LOW COMPLEXITY   CLINICAL DECISION MAKIN [facility] Inpatient Short Form  How much difficulty does the patient currently have. .. Unable A Lot A Little None   1. Turning over in bed (including adjusting bedclothes, sheets and blankets)? [ ] 1   [ ] 2   [X] 3   [ ] 4   2. Sitting down on and standing up from a chair with arms ( e.g., wheelchair, bedside commode, etc.)   [ ] 1   [ ] 2   [X] 3   [ ] 4   3. Moving from lying on back to sitting on the side of the bed? [ ] 1   [ ] 2   [X] 3   [ ] 4   How much help from another person does the patient currently need. .. Total A Lot A Little None   4. Moving to and from a bed to a chair (including a wheelchair)?    [ ] 1   [ ] 2   [X] 3 [ ] 4   5. Need to walk in hospital room? [ ] 1   [ ] 2   [X] 3   [ ] 4   6. Climbing 3-5 steps with a railing? [ ] 1   [ ] 2   [X] 3   [ ] 4   © 2007, Trustees of [address] [place] [address] [address] [address], under license to [employer]. All rights reserved       Score:  Initial: 18 Most Recent: X (Date: -- )     Interpretation of Tool:  Represents activities that are increasingly more difficult (i.e. Bed mobility, Transfers, Gait). Score 24 23 22-20 19-15 14-10 9-7 6       Modifier CH CI CJ CK CL CM CN         · Mobility - Walking and Moving Around:               - CURRENT STATUS:    CK - 40%-59% impaired, limited or restricted               - GOAL STATUS:           CJ - 20%-39% impaired, limited or restricted               - D/C STATUS:                       ---------------To be determined---------------  [place] / Product Type: Medicare /       Medical Necessity:     · Patient is expected to demonstrate progress in strength, range of motion, balance and coordination to increase independence with mobility and ADLs. · Patient demonstrates good rehab potential due to higher previous functional level. Reason for Services/Other Comments:  · Patient continues to require skilled intervention due to decreased R LE strength and ROM and decreased independence with mobility s/p TKA. Use of outcome tool(s) and clinical judgement create a POC that gives a: Clear prediction of patient's progress: LOW COMPLEXITY           Gait Training (8 Minutes):  Gait training to improve and/or restore physical functioning as related to mobility, strength and balance. Ambulated 140 Feet (ft) with Stand-by asssistance using a Walker, rolling and minimal Safety awareness training related to their turning to promote proper body mechanics. Therapeutic Exercise: (15 Minutes):  Exercises per grid below to improve mobility and strength.   Required minimal verbal cues to promote proper body mechanics. Date:  7/27/17 Date:   Date:     ACTIVITY/EXERCISE AM PM AM PM AM PM   GROUP THERAPY  []  []  []  []  []  []   Ankle Pumps 15        Quad Sets 15        Gluteal Sets 15        Hip ABd/ADduction 15        Straight Leg Raises 15        Knee Slides 15        Short Arc Quads 15        Long Arc Quads         Chair Slides                  B = bilateral; AA = active assistive; A = active; P = passive             TREATMENT:   (In addition to Assessment/Re-Assessment sessions the following treatments were rendered)      Pre-treatment Symptoms/Complaints:  Patient reports some nausea. Pain: Initial: Pain Intensity 1: 5  Pain Location 1: Knee  Pain Orientation 1: Right  Pain Intervention(s) 1: Cold pack  Post Session:  5/10         Treatment/Session Assessment:         Response to Treatment:  Patient tolerated therapy assessment well and expect her to make good progress. Education:  [ ] Home Exercises  [ ] Fall Precautions  [ ] Hip Precautions [ ] D/C Instruction Review  [ ] Knee/Hip Prosthesis Review  [ ] Kathyleen Reveal Management/Safety [ ] Adaptive Equipment as Needed         Interdisciplinary Collaboration:   · Physical Therapist and Registered Nurse     After treatment position/precautions:   · Up in chair, Bed/Chair-wheels locked, Bed in low position, Call light within reach and Family at bedside     Compliance with Program/Exercises: compliant all of the time. Recommendations/Intent for next treatment session:  Treatment next visit will focus on increasing Ms. Kelley's independence with bed mobility, transfers, gait training, strength/ROM exercises, modalities for pain, and patient education.        Total Treatment Duration:  PT Patient Time In/Time Out  Time In: 0930  Time Out: [address]

## 2017-07-27 NOTE — PROGRESS NOTES
Problem: Self Care Deficits Care Plan (Adult)  Goal: *Acute Goals and Plan of Care (Insert Text)  GOALS: GOAL MET 7/27/2017  DISCHARGE GOALS (in preparation for going home/rehab): 3 days  1. Ms. Tracie Vera will perform one lower body dressing activity with minimal assistance required to demonstrate improved functional mobility and safety. 2. Ms. Tracie Vera will perform one lower body bathing activity with minimal assistance required to demonstrate improved functional mobility and safety. 3. Ms. Tracie Vera will perform toileting/toilet transfer with contact guard assistance to demonstrate improved functional mobility and safety. 4. Ms. Tracie Vera will perform shower transfer with contact guard assistance to demonstrate improved functional mobility and safety. JOINT CAMP OCCUPATIONAL THERAPY TKA: Daily Note and Discharge 7/27/2017  INPATIENT: Hospital Day: 2  [place] / [place] / Product Type: Medicare /      NAME/AGE/GENDER: Fabrice Hannah is a 80 y.o. female      PRIMARY DIAGNOSIS:  Unilateral primary osteoarthritis, right knee [M17.11]              Procedure(s) and Anesthesia Type:     * RIGHT KNEE ARTHROPLASTY TOTAL / DOMINIK/ FNB - Spinal (Right)  ICD-10: Treatment Diagnosis:        · Pain in Right Knee (M25.561)  · Stiffness of Right Knee, Not elsewhere classified (O58.934)       ASSESSMENT:    Ms. Tracie Vera is s/p right TKA and presents with decreased weight bearing on right LE and decreased independence with functional mobility and activities of daily living. Patient completed shower and dressing as charter below in ADL grid and is ambulating with rolling walker and stand by assist.  Patient has met 4/4 goals and plans to return home with good family support. Family able to provide patient with appropriate level of assistance at this time. OT reviewed safety precautions throughout session and therapy schedule for the remainder of today.   Patient instructed to call for assistance when needing to get up from recliner and all needs in reach. Patient verbalized understanding of call light. This section established at most recent assessment   PROBLEM LIST (Impairments causing functional limitations):  1. Decreased Strength  2. Decreased ADL/Functional Activities  3. Decreased Transfer Abilities  4. Increased Pain  5. Increased Fatigue  6. Decreased Flexibility/Joint Mobility  7. Decreased Knowledge of Precautions    INTERVENTIONS PLANNED: (Benefits and precautions of occupational therapy have been discussed with the patient.)  1. Activities of daily living training  2. Adaptive equipment training  3. Balance training  4. Clothing management  5. Donning&doffing training  6. Theraputic activity      TREATMENT PLAN: Frequency/Duration: Follow patient 1-2 times to address above goals. Rehabilitation Potential For Stated Goals: GOOD      RECOMMENDED REHABILITATION/EQUIPMENT: (at time of discharge pending progress): Continue Skilled Therapy and Home Health: Physical Therapy. OCCUPATIONAL PROFILE AND HISTORY:   History of Present Injury/Illness (Reason for Referral): Pt presents this date s/p (right) TKA. Past Medical History/Comorbidities:   Ms. Yesi Thompson  has a past medical history of Adverse effect of anesthesia; Anxiety (11/26/2012); Bilateral tinnitus; Breast tenderness (11/26/2012); Environmental and seasonal allergies; Former smoker; Headache (11/26/2012); Hematuria, microscopic (11/26/2012); Hiatal hernia; Hyperglycemia (11/26/2012); Hyperlipidemia (11/26/2012); IBS (irritable bowel syndrome); Osteoarthritis (11/26/2012); Sarcoidosis ([address] (11/26/2012); Thyroid nodule (11/26/2012); Tortuous aorta ([address] Unilateral primary osteoarthritis, right knee; Vitamin D deficiency (11/26/2012); and Weight gain (11/26/2012). She also has no past medical history of Difficult intubation; Malignant hyperthermia due to anesthesia; Nausea & vomiting; or Pseudocholinesterase deficiency. Ms. Lucius Chand  has a past surgical history that includes cholecystectomy; endoscopy, colon, diagnostic; tonsillectomy ();  section (); colonoscopy (); and tumor removal (Left). Social History/Living Environment:   Home Environment: Private residence  # Steps to Enter: 0  One/Two Story Residence: Two story, live on 1st floor  # of Interior Steps: 12  Interior Rails: Left  Living Alone: No  Support Systems: Spouse/Significant Other/Partner  Patient Expects to be Discharged to[de-identified] Private residence  Current DME Used/Available at Home: [address], [place], straight, [employer], bedside, Crutches, Walker, rolling  Tub or Shower Type: Tub/Shower combination  Prior Level of Function/Work/Activity:  Independent       Number of Personal Factors/Comorbidities that affect the Plan of Care: Brief history (0):  LOW COMPLEXITY   ASSESSMENT OF OCCUPATIONAL PERFORMANCE[de-identified]   Most Recent Physical Functioning:   Balance  Sitting: Intact  Standing: Intact; With support        Patient Vitals for the past 6 hrs:   BP SpO2 Pulse   17 1004 - 95 % -   17 1155 108/59 93 % 61                                   Mental Status  Neurologic State: Alert  Orientation Level: Oriented X4            RLE AROM  R Knee Flexion: 86  R Knee Extension: 10       Basic ADLs (From Assessment) Complex ADLs (From Assessment)   Basic ADL  Feeding: Independent  Oral Facial Hygiene/Grooming: Supervision  Bathing: Moderate assistance  Upper Body Dressing: Supervision  Lower Body Dressing: Moderate assistance  Toileting:  Moderate assistance     Grooming/Bathing/Dressing Activities of Daily Living   Grooming  Grooming Assistance: Stand-by assistance (stand by assistance )     Upper Body Bathing  Bathing Assistance: Supervision/set-up (seated on shower chair)     Lower Body Bathing  Bathing Assistance: Stand-by assistance (seated on shower chair ) Toileting  Toileting Assistance: Stand-by assistance  Cues: Verbal cues provided  Adaptive Equipment: Grab bars;Elevated seat;Walker   Upper Body Dressing Assistance  Dressing Assistance: Supervision/set-up Functional Transfers  Bathroom Mobility: Stand-by assistance  Toilet Transfer : Stand-by asssistance  Shower Transfer: Stand-by asssistance  Cues: Verbal cues provided  Adaptive Equipment: Shower chair with back; Walker (comment)   Lower Body Dressing Assistance  Dressing Assistance: Minimum assistance (pants and underpants) Bed/Mat Mobility  Sit to Stand: Stand-by asssistance           Physical Skills Involved:  1. Range of Motion  2. Balance  3. Strength  4. Pain (acute) Cognitive Skills Affected (resulting in the inability to perform in a timely and safe manner): 1. none Psychosocial Skills Affected:  1. Environmental Adaptation   Number of elements that affect the Plan of Care: 3-5:  MODERATE COMPLEXITY   CLINICAL DECISION MAKIN[address] [place] [address] AM-PAC 6 Clicks   Daily Activity Inpatient Short Form  How much help from another person does the patient currently need. .. Total A Lot A Little None   1. Putting on and taking off regular lower body clothing?   [ ] 1   [X] 2   [ ] 3   [ ] 4   2. Bathing (including washing, rinsing, drying)? [ ] 1   [X] 2   [ ] 3   [ ] 4   3. Toileting, which includes using toilet, bedpan or urinal?   [ ] 1   [ ] 2   [X] 3   [ ] 4   4. Putting on and taking off regular upper body clothing?   [ ] 1   [ ] 2   [ ] 3   [X] 4   5. Taking care of personal grooming such as brushing teeth? [ ] 1   [ ] 2   [ ] 3   [X] 4   6. Eating meals? [ ] 1   [ ] 2   [ ] 3   [X] 4   © , Trustees of [address] [place] [id number], under license to [employer]. All rights reserved   Score:  Initial: 19 Most Recent: X (Date: -- )     Interpretation of Tool:  Represents activities that are increasingly more difficult (i.e. Bed mobility, Transfers, Gait).        Score 24 23 22-20 19-15 14-10 9-7 6       Modifier CH CI CJ CK CL CM CN         · Self Care:               - CURRENT STATUS:    CK - 40%-59% impaired, limited or restricted               - GOAL STATUS:           CJ - 20%-39% impaired, limited or restricted               - D/C STATUS:              ---------------To be determined---------------     [place] / [place] / Product Type: Medicare /       Medical Necessity:     · Patient is expected to demonstrate progress in range of motion, balance and functional technique to increase independence with self care. Reason for Services/Other Comments:  · Patient would benefit from skilled Occupational Therapy to maximize independence and safety with self-care task and functional mobility. .   Use of outcome tool(s) and clinical judgement create a POC that gives a: LOW COMPLEXITY                 TREATMENT:   (In addition to Assessment/Re-Assessment sessions the following treatments were rendered)      Pre-treatment Symptoms/Complaints:  Pt with no complaint of pain during self care   Pain: Initial:   Pain Intensity 1: 0 0 Post Session:  0      Self Care: (45min): Procedure(s) (per grid) utilized to improve and/or restore self-care/home management as related to dressing, bathing, toileting and grooming. Required minimal verbal and   cueing to facilitate activities of daily living skills and compensatory activities. Treatment/Session Assessment:         Response to Treatment:  Pt tolerated well. Education:  [ ] Home Exercises  [X] Fall Precautions  [ ] Hip Precautions [ ] Going Home Video  [X] Knee/Hip Prosthesis Review  [X] Walker Management/Safety [X] Adaptive Equipment as Needed         Interdisciplinary Collaboration:   · Physical Therapist  · Occupational Therapist  · Registered Nurse     After treatment position/precautions:   · Up in chair, Bed/Chair-wheels locked, Call light within reach, RN notified and Family at bedside     Compliance with Program/Exercises: compliant all of the time.      Recommendations/Intent for next treatment session:  All goals met no further OT warranted      Total Treatment Duration:  OT Patient Time In/Time Out  Time In: 1045  Time Out: [address] [facility], OT

## 2017-07-27 NOTE — PROGRESS NOTES
ID shows probable need for IVAB. PICC line ordered. Final home IVAB orders pending final cultures. Initial referral sent to [facility] to pursue coverage. .  Patient and wife ready to begin IVAB education. [facility] aware of possible d/c on Friday. [facility] [place] on stand by to provide home nursing. Will follow.  Rocio Davenport

## 2017-07-27 NOTE — PROGRESS NOTES
07/27/17 1004   Oxygen Therapy   O2 Sat (%) 95 %   Pulse via Oximetry 75 beats per minute   O2 Device Room air   Patient achieved      1500    Ml/sec on IS. Patient encouraged to do every hour while awake-patient agreed and demonstrated. No shortness of breath or distress noted. BS are clear b/l.

## 2017-07-27 NOTE — PROGRESS NOTES
Pt resting quietly in the bed and was asleep when I first entered the room. Alert and oriented,  Dressing to right knee clean dry and intact,  NV status WNL's with good dorsiflexion bilaterally and 2+    Pedal pulses. Abed in low locked position and call light within reach.   No noted distress

## 2017-07-28 VITALS
RESPIRATION RATE: 17 BRPM | WEIGHT: 171 LBS | SYSTOLIC BLOOD PRESSURE: 121 MMHG | HEART RATE: 75 BPM | DIASTOLIC BLOOD PRESSURE: 67 MMHG | TEMPERATURE: 97.6 F | BODY MASS INDEX: 32.28 KG/M2 | HEIGHT: 61 IN | OXYGEN SATURATION: 94 %

## 2017-07-28 LAB
GLUCOSE BLD STRIP.AUTO-MCNC: 116 MG/DL (ref 65–100)
HGB BLD-MCNC: 10.7 G/DL (ref 11.7–15.4)
MM INDURATION POC: 0 MM (ref 0–5)
PPD POC: NORMAL NEGATIVE

## 2017-07-28 PROCEDURE — 36415 COLL VENOUS BLD VENIPUNCTURE: CPT | Performed by: PHYSICIAN ASSISTANT

## 2017-07-28 PROCEDURE — 85018 HEMOGLOBIN: CPT | Performed by: PHYSICIAN ASSISTANT

## 2017-07-28 PROCEDURE — 74011250637 HC RX REV CODE- 250/637: Performed by: PHYSICIAN ASSISTANT

## 2017-07-28 PROCEDURE — 82962 GLUCOSE BLOOD TEST: CPT

## 2017-07-28 PROCEDURE — 97116 GAIT TRAINING THERAPY: CPT

## 2017-07-28 PROCEDURE — 97150 GROUP THERAPEUTIC PROCEDURES: CPT

## 2017-07-28 RX ADMIN — HYDROMORPHONE HYDROCHLORIDE 2 MG: 2 TABLET ORAL at 07:12

## 2017-07-28 RX ADMIN — ASPIRIN 325 MG: 325 TABLET, DELAYED RELEASE ORAL at 07:12

## 2017-07-28 RX ADMIN — ACETAMINOPHEN 1000 MG: 500 TABLET, FILM COATED ORAL at 06:01

## 2017-07-28 NOTE — PROGRESS NOTES
Prescription given for dilaudid and instructed how to take. Instructed to take aspirin. Has follow up appt already scheduled with Dr Cristal Bueno. Instructed to continue wearing TEDs and to call doctor if having fever, excessive drainage, numbness or other problems. Home therapy to see pt. I have reviewed discharge instructions with the patient, spouse and daughter. The patient, spouse and daughter verbalized understanding. Taken to car via wheelchair. Discharged home with daughter and .

## 2017-07-28 NOTE — DISCHARGE INSTRUCTIONS
Call your doctor for any of the following:    Temp greater than 101  Nausea or vomiting  Pain unrelieved by medication  Swelling, numbness or tingling  Yellow or green drainage from incision  Questions or concerns  Do not take a tub bath. You may shower. Do not remove your bandage. The home health staff will change the bandage as needed. Only sit in chairs with arms. You will need them to help you stand. Always use your walker until the physical therapist instructs you to use a cane. Only perform exercises as instructed by your therapist.       Total Knee Replacement: What to Expect at [address] [place] [address]    When you leave the hospital, you should be able to move around with a walker or crutches. But you will need someone to help you at home for the next few weeks or until you have more energy and can move around better. If there is no one to help you at home, you may go to a rehabilitation center. You will go home with a bandage and stitches or staples. Change the bandage as your doctor tells you to. Your doctor will remove your stitches or staples 10 to 21 days after your surgery. You may still have some mild pain, and the area may be swollen for 3 to 6 months after surgery. Your knee will continue to improve for 6 to 12 months. You will probably use a walker for 1 to 3 weeks and then use crutches. When you are ready, you can use a cane. You will probably be able to walk on your own in 4 to 8 weeks. You will need to do months of physical rehabilitation (rehab) after a knee replacement. Rehab will help you strengthen the muscles of the knee and help you regain movement. After you recover, your artificial knee will allow you to do normal daily activities with less pain or no pain at all. You may be able to hike, dance, ride a bike, and play golf. Talk to your doctor about whether you can do more strenuous activities. Always tell your caregivers that you have an artificial knee.   How long it will take to walk on your own, return to normal activities, and go back to work depends on your health and how well your rehabilitation (rehab) program goes. The better you do with your rehab exercises, the quicker you will get your strength and movement back. This care sheet gives you a general idea about how long it will take for you to recover. But each person recovers at a different pace. Follow the steps below to get better as quickly as possible. How can you care for yourself at home? Activity  · Rest when you feel tired. You may take a nap, but do not stay in bed all day. When you sit, use a chair with arms. You can use the arms to help you stand up. · Work with your physical therapist to find the best way to exercise. You may be able to take frequent, short walks using crutches or a walker. What you can do as your knee heals will depend on whether your new knee is cemented or uncemented. You may not be able to do certain things for a while if your new knee is uncemented. · After your knee has healed enough, you can do more strenuous activities with caution. ¨ You can golf, but use a golf cart, and do not wear shoes with spikes. ¨ You can bike on a flat road or on a stationary bike. Avoid biking up hills. ¨ Your doctor may suggest that you stay away from activities that put stress on your knee. These include tennis or badminton, squash or racquetball, contact sports like football, jumping (such as in basketball), jogging, or running. ¨ Avoid activities where you might fall. These include horseback riding, skiing, and mountain biking. · Do not sit for more than 1 hour at a time. Get up and walk around for a while before you sit again. If you must sit for a long time, prop up your leg with a chair or footstool. This will help you avoid swelling. · Ask your doctor when you can shower. You may need to take sponge baths until your stitches or staples have been removed. · Ask your doctor when you can drive again.  It may take up to 8 weeks after knee replacement surgery before it is safe for you to drive. · When you get into a car, sit on the edge of the seat. Then pull in your legs, and turn to face the front. · You should be able to do many everyday activities 3 to 6 weeks after your surgery. You will probably need to take 4 to 16 weeks off from work. When you can go back to work depends on the type of work you do and how you feel. · Ask your doctor when it is okay for you to have sex. · Do not lift anything heavier than 10 pounds and do not lift weights for 12 weeks. Diet  · By the time you leave the hospital, you should be eating your normal diet. If your stomach is upset, try bland, low-fat foods like plain rice, broiled chicken, toast, and yogurt. Your doctor may suggest that you take iron and vitamin supplements. · Drink plenty of fluids (unless your doctor tells you not to). · Eat healthy foods, and watch your portion sizes. Try to stay at your ideal weight. Too much weight puts more stress on your new knee. · You may notice that your bowel movements are not regular right after your surgery. This is common. Try to avoid constipation and straining with bowel movements. You may want to take a fiber supplement every day. If you have not had a bowel movement after a couple of days, ask your doctor about taking a mild laxative. Medicines  · Your doctor will tell you if and when you can restart your medicines. He or she will also give you instructions about taking any new medicines. · If you take blood thinners, such as warfarin (Coumadin), clopidogrel (Plavix), or aspirin, be sure to talk to your doctor. He or she will tell you if and when to start taking those medicines again. Make sure that you understand exactly what your doctor wants you to do. · Your doctor may give you a blood-thinning medicine to prevent blood clots.  If you take a blood thinner, be sure you get instructions about how to take your medicine safely. Blood thinners can cause serious bleeding problems. This medicine could be in pill form or as a shot (injection). If a shot is necessary, your doctor will tell you how to do this. · Be safe with medicines. Take pain medicines exactly as directed. ¨ If the doctor gave you a prescription medicine for pain, take it as prescribed. ¨ If you are not taking a prescription pain medicine, ask your doctor if you can take an over-the-counter medicine. ¨ Plan to take your pain medicine 30 minutes before exercises. It is easier to prevent pain before it starts than to stop it once it has started. · If you think your pain medicine is making you sick to your stomach:  ¨ Take your medicine after meals (unless your doctor has told you not to). ¨ Ask your doctor for a different pain medicine. · If your doctor prescribed antibiotics, take them as directed. Do not stop taking them just because you feel better. You need to take the full course of antibiotics. Incision care  · You will have a bandage over the cut (incision) and staples or stitches. Take the bandage off when your doctor says it is okay. · Your doctor will remove the staples or stitches 10 days to 3 weeks after the surgery and replace them with strips of tape. Leave the tape on for a week or until it falls off. Exercise  · Your rehab program will give you a number of exercises to do to help you get back your knee's range of motion and strength. Always do them as your therapist tells you. Ice and elevation  · For pain and swelling, put ice or a cold pack on the area for 10 to 20 minutes at a time. Put a thin cloth between the ice and your skin. Other instructions  · Continue to wear your support stockings as your doctor says. These help to prevent blood clots. The length of time that you will have to wear them depends on your activity level and the amount of swelling.   · Wear medical alert jewelry that says you may need antibiotics before any procedure, including dental work. You can buy this at most drugstores. · You have metal pieces in your knee. These may set off some airport metal detectors. Carry a medical alert card that says you have an artificial joint, just in case. Follow-up care is a key part of your treatment and safety. Be sure to make and go to all appointments, and call your doctor if you are having problems. It's also a good idea to know your test results and keep a list of the medicines you take. When should you call for help? Call 911 anytime you think you may need emergency care. For example, call if:  · You passed out (lost consciousness). · You have severe trouble breathing. · You have sudden chest pain and shortness of breath, or you cough up blood. Call your doctor now or seek immediate medical care if:  · You have signs of infection, such as:  ¨ Increased pain, swelling, warmth, or redness. ¨ Red streaks leading from the incision. ¨ Pus draining from the incision. ¨ A fever. · You have signs of a blood clot, such as:  ¨ Pain in your calf, back of the knee, thigh, or groin. ¨ Redness and swelling in your leg or groin. · Your incision comes open and begins to bleed, or the bleeding increases. · You have pain that does not get better after you take pain medicine. Watch closely for changes in your health, and be sure to contact your doctor if:  · You do not have a bowel movement after taking a laxative. Where can you learn more? Go to http://paula-ranjan.info/. Enter G999 in the search box to learn more about \"Total Knee Replacement: What to Expect at Home. \"  Current as of: March 21, 2017  Content Version: 11.3  © 3942-2989 [employer]. Care instructions adapted under license by [employer] (which disclaims liability or warranty for this information).  If you have questions about a medical condition or this instruction, always ask your healthcare professional. Sindy Conklin disclaims any warranty or liability for your use of this information. DISCHARGE SUMMARY from Nurse    The following personal items are in your possession at time of discharge:    Dental Appliances: None  Visual Aid: Glasses     Home Medications: Kept at bedside  Jewelry: None  Clothing: Footwear, Pants, Shirt  Other Valuables: Cell Phone             PATIENT INSTRUCTIONS:    After general anesthesia or intravenous sedation, for 24 hours or while taking prescription Narcotics:  · Limit your activities  · Do not drive and operate hazardous machinery  · Do not make important personal or business decisions  · Do  not drink alcoholic beverages  · If you have not urinated within 8 hours after discharge, please contact your surgeon on call. Report the following to your surgeon:  · Excessive pain, swelling, redness or odor of or around the surgical area  · Temperature over 100.5  · Nausea and vomiting lasting longer than 4 hours or if unable to take medications  · Any signs of decreased circulation or nerve impairment to extremity: change in color, persistent  numbness, tingling, coldness or increase pain  · Any questions        What to do at Home:    *  Please give a list of your current medications to your Primary Care Provider. *  Please update this list whenever your medications are discontinued, doses are      changed, or new medications (including over-the-counter products) are added. *  Please carry medication information at all times in case of emergency situations. These are general instructions for a healthy lifestyle:    No smoking/ No tobacco products/ Avoid exposure to second hand smoke    Surgeon General's Warning:  Quitting smoking now greatly reduces serious risk to your health.     Obesity, smoking, and sedentary lifestyle greatly increases your risk for illness    A healthy diet, regular physical exercise & weight monitoring are important for maintaining a healthy lifestyle    You may be retaining fluid if you have a history of heart failure or if you experience any of the following symptoms:  Weight gain of 3 pounds or more overnight or 5 pounds in a week, increased swelling in our hands or feet or shortness of breath while lying flat in bed. Please call your doctor as soon as you notice any of these symptoms; do not wait until your next office visit. Recognize signs and symptoms of STROKE:    F-face looks uneven    A-arms unable to move or move unevenly    S-speech slurred or non-existent    T-time-call 911 as soon as signs and symptoms begin-DO NOT go       Back to bed or wait to see if you get better-TIME IS BRAIN. Warning Signs of HEART ATTACK     Call 911 if you have these symptoms:   Chest discomfort. Most heart attacks involve discomfort in the center of the chest that lasts more than a few minutes, or that goes away and comes back. It can feel like uncomfortable pressure, squeezing, fullness, or pain.  Discomfort in other areas of the upper body. Symptoms can include pain or discomfort in one or both arms, the back, neck, jaw, or stomach.  Shortness of breath with or without chest discomfort.  Other signs may include breaking out in a cold sweat, nausea, or lightheadedness. Don't wait more than five minutes to call 911 - MINUTES MATTER! Fast action can save your life. Calling 911 is almost always the fastest way to get lifesaving treatment. Emergency Medical Services staff can begin treatment when they arrive -- up to an hour sooner than if someone gets to the hospital by car. The discharge information has been reviewed with the patient. The patient verbalized understanding. Discharge medications reviewed with the patient and appropriate educational materials and side effects teaching were provided.

## 2017-07-28 NOTE — PROGRESS NOTES
Up in recliner. Medicated for pain with dilaudid po. Aquacel to R knee remains dry and intact. Iceman to knee. TEDs on. Good movement and sensation to LEs.

## 2017-07-28 NOTE — PROGRESS NOTES
Problem: Mobility Impaired (Adult and Pediatric)  Goal: *Acute Goals and Plan of Care (Insert Text)  GOALS (1-4 days):  (1.)Ms. Sybil Correia will move from supine to sit and sit to supine in bed with SUPERVISION. (2.)Ms. Sybil Correia will transfer from bed to chair and chair to bed with SUPERVISION using the least restrictive device. (3.)Ms. Sybil Correia will ambulate with SUPERVISION for 200 feet with the least restrictive device. (4.)Ms. Sybil Correia will increase right knee ROM to 5°-80°. MET 7/28/17  ________________________________________________________________________________________________       PHYSICAL THERAPY JOINT CAMP TKA: Daily Note and AM 7/28/2017  INPATIENT: Hospital Day: 3  [place] / [place] / Product Type: Medicare /      NAME/AGE/GENDER: Lyle Tyson is a 80 y.o. female      PRIMARY DIAGNOSIS:  Unilateral primary osteoarthritis, right knee [M17.11]              Procedure(s) and Anesthesia Type:     * RIGHT KNEE ARTHROPLASTY TOTAL / DOMINIK/ FNB - Spinal (Right)  ICD-10: Treatment Diagnosis:        · Stiffness of Right Knee, Not elsewhere classified (M25.661)  · Difficulty in walking, Not elsewhere classified (R26.2)       ASSESSMENT:      Ms. Sybil Correia is doing well this morning and is planning to go home with HHPT. She greatly improved in her gait distance. She ambulated up and down rehab stairs with SBA and verbal cues for safety. This section established at most recent assessment   PROBLEM LIST (Impairments causing functional limitations):  1. Decreased Strength  2. Decreased ADL/Functional Activities  3. Decreased Transfer Abilities  4. Decreased Ambulation Ability/Technique  5. Decreased Balance    INTERVENTIONS PLANNED: (Benefits and precautions of physical therapy have been discussed with the patient.)  1. Bed Mobility  2. Gait Training  3. Home Exercise Program (HEP)  4. Therapeutic Exercise/Strengthening  5. Transfer Training  6.  Range of Motion: active/assisted/passive  7. Therapeutic Activities  8. Group Therapy      TREATMENT PLAN: Frequency/Duration: Follow patient BID   to address above goals. Rehabilitation Potential For Stated Goals: GOOD      RECOMMENDED REHABILITATION/EQUIPMENT: (at time of discharge pending progress): Continue Skilled Therapy and Home Health: Physical Therapy. HISTORY:   History of Present Injury/Illness (Reason for Referral):  R TKA 17  Past Medical History/Comorbidities:   Ms. Lucius Chand  has a past medical history of Adverse effect of anesthesia; Anxiety (2012); Bilateral tinnitus; Breast tenderness (2012); Environmental and seasonal allergies; Former smoker; Headache (2012); Hematuria, microscopic (2012); Hiatal hernia; Hyperglycemia (2012); Hyperlipidemia (2012); IBS (irritable bowel syndrome); Osteoarthritis (2012); Sarcoidosis ([facility] (2012); Thyroid nodule (2012); Tortuous aorta ([facility] Unilateral primary osteoarthritis, right knee; Vitamin D deficiency (2012); and Weight gain (2012). She also has no past medical history of Difficult intubation; Malignant hyperthermia due to anesthesia; Nausea & vomiting; or Pseudocholinesterase deficiency. Ms. Lucius Chand  has a past surgical history that includes cholecystectomy; endoscopy, colon, diagnostic; tonsillectomy ();  section (); colonoscopy (); and tumor removal (Left).   Social History/Living Environment:   Home Environment: Private residence  # Steps to Enter: 0  One/Two Story Residence: Two story, live on 1st floor  # of Interior Steps: 12  Interior Rails: Left  Living Alone: No  Support Systems: Spouse/Significant Other/Partner  Patient Expects to be Discharged to[de-identified] Private residence  Current DME Used/Available at Home: Cane, straight, Commode, bedside, Crutches, Walker, rolling  Tub or Shower Type: Tub/Shower combination  Prior Level of Function/Work/Activity:  Ambulating with single crutch   Number of Personal Factors/Comorbidities that affect the Plan of Care: 1-2: MODERATE COMPLEXITY   EXAMINATION:   Most Recent Physical Functioning:                RLE AROM  R Knee Flexion: 88  R Knee Extension: 2                    Transfers  Sit to Stand: Stand-by asssistance  Stand to Sit: Stand-by asssistance                      Weight Bearing Status  Right Side Weight Bearing: As tolerated  Distance (ft): 274 Feet (ft) (x2)  Ambulation - Level of Assistance: Stand-by asssistance  Assistive Device: Walker, rolling  Speed/Angelia: Delayed;Pace decreased (<100 feet/min)  Step Length: Left shortened;Right shortened  Stance: Right decreased  Gait Abnormalities: Antalgic  Interventions: Verbal cues      Braces/Orthotics: none     Right Knee Cold  Type: Cryocuff       Body Structures Involved:  1. Joints  2. Muscles Body Functions Affected:  1. Movement Related Activities and Participation Affected:  1. Mobility  2. Self Care  3. Domestic Life  4. Community, Social and [place] Adrian   Number of elements that affect the Plan of Care: 4+: HIGH COMPLEXITY   CLINICAL PRESENTATION:   Presentation: Stable and uncomplicated: LOW COMPLEXITY   CLINICAL DECISION MAKIN [facility] Inpatient Short Form  How much difficulty does the patient currently have. .. Unable A Lot A Little None   1. Turning over in bed (including adjusting bedclothes, sheets and blankets)? [ ] 1   [ ] 2   [X] 3   [ ] 4   2. Sitting down on and standing up from a chair with arms ( e.g., wheelchair, bedside commode, etc.)   [ ] 1   [ ] 2   [X] 3   [ ] 4   3. Moving from lying on back to sitting on the side of the bed? [ ] 1   [ ] 2   [X] 3   [ ] 4   How much help from another person does the patient currently need. .. Total A Lot A Little None   4. Moving to and from a bed to a chair (including a wheelchair)? [ ] 1   [ ] 2   [X] 3   [ ] 4   5. Need to walk in hospital room?    [ ] 1   [ ] 2   [X] 3   [ ] 4   6. Climbing 3-5 steps with a railing? [ ] 1   [ ] 2   [X] 3   [ ] 4   © 2007, Trustees of [address] [place] [address] [address] [address], under license to [employer]. All rights reserved       Score:  Initial: 18 Most Recent: X (Date: -- )     Interpretation of Tool:  Represents activities that are increasingly more difficult (i.e. Bed mobility, Transfers, Gait). Score 24 23 22-20 19-15 14-10 9-7 6       Modifier CH CI CJ CK CL CM CN         · Mobility - Walking and Moving Around:               - CURRENT STATUS:    CK - 40%-59% impaired, limited or restricted               - GOAL STATUS:           CJ - 20%-39% impaired, limited or restricted               - D/C STATUS:                       ---------------To be determined---------------  [place] / [place] / Product Type: Medicare /       Medical Necessity:     · Patient is expected to demonstrate progress in strength, range of motion, balance and coordination to increase independence with mobility and ADLs. · Patient demonstrates good rehab potential due to higher previous functional level. Reason for Services/Other Comments:  · Patient continues to require skilled intervention due to decreased R LE strength and ROM and decreased independence with mobility s/p TKA. Use of outcome tool(s) and clinical judgement create a POC that gives a: Clear prediction of patient's progress: LOW COMPLEXITY           Gait Training (15 Minutes):  Gait training to improve and/or restore physical functioning as related to mobility, strength and balance. Ambulated 274 Feet (ft) (x2) with Stand-by asssistance using a Walker, rolling and minimal Verbal cues related to their turning to promote proper body mechanics. Therapeutic Exercise: (45 Minutes (group)):  Exercises per grid below to improve mobility and strength. Required minimal verbal cues to promote proper body mechanics.        Date:  7/27/17 Date:  7/28/17 Date: ACTIVITY/EXERCISE AM PM AM PM AM PM   GROUP THERAPY  []  [x]  [x]  []  []  []   Ankle Pumps 15 15 20      Quad Sets 15 15 20      Gluteal Sets 15 15 20      Hip ABd/ADduction 15 15 20      Straight Leg Raises 15 15 20      Knee Slides 15 15 20      Short Arc Quads 15 15 20      Long Arc Quads         Chair Slides  15 20               B = bilateral; AA = active assistive; A = active; P = passive      TREATMENT:   (In addition to Assessment/Re-Assessment sessions the following treatments were rendered)      Pre-treatment Symptoms/Complaints:  Pt with minimal complaints of pain. Pain: Initial:    Post Session:  4/10         Treatment/Session Assessment:         Response to Treatment:  Continues to do well. Education:  [ ] Home Exercises  [ ] Fall Precautions  [ ] Hip Precautions ConiDivyaek ] D/C Instruction Review  [ ] Knee/Hip Prosthesis Review  [X] Walker Management/Safety [ ] Adaptive Equipment as Needed         Interdisciplinary Collaboration:   · Physical Therapist, Registered Nurse and Rehabilitation Attendant     After treatment position/precautions:   · Up in chair, Bed/Chair-wheels locked, Bed in low position, Call light within reach and Family at bedside     Compliance with Program/Exercises: compliant all of the time. Recommendations/Intent for next treatment session:  Treatment next visit will focus on increasing Ms. Kelley's independence with bed mobility, transfers, gait training, strength/ROM exercises, modalities for pain, and patient education.        Total Treatment Duration:  PT Patient Time In/Time Out  Time In: 1030  Time Out: [address] Kovacs, PTA

## 2017-07-28 NOTE — PROGRESS NOTES
2017         Post Op day: 2 Days Post-OpProcedure(s) (LRB):  RIGHT KNEE ARTHROPLASTY TOTAL / DOMINIK/ FNB (Right)      Admit Date: 2017  Admit Diagnosis: Unilateral primary osteoarthritis, right knee [M17.11]    LAB:    Recent Results (from the past 24 hour(s))   GLUCOSE, POC    Collection Time: 17 11:39 AM   Result Value Ref Range    Glucose (POC) 161 (H) 65 - 100 mg/dL   PLEASE READ & DOCUMENT PPD TEST IN 24 HRS    Collection Time: 17  1:56 PM   Result Value Ref Range    PPD  Negative    mm Induration 0 mm   GLUCOSE, POC    Collection Time: 17  4:30 PM   Result Value Ref Range    Glucose (POC) 193 (H) 65 - 100 mg/dL   GLUCOSE, POC    Collection Time: 17  8:20 PM   Result Value Ref Range    Glucose (POC) 170 (H) 65 - 100 mg/dL   HEMOGLOBIN    Collection Time: 17  4:32 AM   Result Value Ref Range    HGB 10.7 (L) 11.7 - 15.4 g/dL   GLUCOSE, POC    Collection Time: 17  6:04 AM   Result Value Ref Range    Glucose (POC) 116 (H) 65 - 100 mg/dL     Vital Signs:    Patient Vitals for the past 8 hrs:   BP Temp Pulse Resp SpO2   17 0255 121/61 96.7 °F (35.9 °C) 68 16 96 %     Temp (24hrs), Av.4 °F (35.8 °C), Min:95.8 °F (35.4 °C), Max:96.7 °F (35.9 °C)    Body mass index is 32.31 kg/(m^2).   Pain Control:   Pain Assessment  Pain Scale 1: Numeric (0 - 10)  Pain Intensity 1: 3  Pain Location 1: Knee  Pain Orientation 1: Right  Pain Description 1: Sore  Pain Intervention(s) 1: Medication (see MAR), Ice    Subjective: Doing well, No complaints, No SOB, No Chest Pain, No Nausea or Vomitting     Objective: Vital Signs are Stable, No Acute Distress, Alert and Oriented, Dressing is Dry,  Neurovascular exam is normal.       PT/OT:            Assistive Device: Walker (comment)  RLE AROM  R Knee Flexion: 86  R Knee Extension: 10             Weight Bearing Status: WBAT    Meds:  [unfilled]  [unfilled]  [unfilled]    Assessment:   Patient Active Problem List   Diagnosis Code    Breast tenderness N64.4    Anxiety F41.9    IBS (irritable bowel syndrome) K58.9    Headache R51    Hyperglycemia R73.9    Sarcoidosis (HCC) D86.9    Osteoarthritis M19.90    Hematuria, microscopic R31.29    Thyroid nodule E04.1    Vitamin D deficiency E55.9    Bilateral tinnitus H93.13    Osteoarthritis of right knee M17.11    Status post total right knee replacement Z96.651             Plan: Continue Physical Therapy, Monitor  Hbg, home today          Signed By: Radha Espino MD

## 2022-03-18 PROBLEM — M17.11 OSTEOARTHRITIS OF RIGHT KNEE: Status: ACTIVE | Noted: 2017-07-26

## 2022-03-20 PROBLEM — Z96.651 STATUS POST TOTAL RIGHT KNEE REPLACEMENT: Status: ACTIVE | Noted: 2017-07-26

## 2022-03-30 PROBLEM — E11.65 TYPE 2 DIABETES MELLITUS WITH HYPERGLYCEMIA, WITHOUT LONG-TERM CURRENT USE OF INSULIN (HCC): Status: ACTIVE | Noted: 2022-03-30

## 2022-03-30 PROBLEM — E11.9 TYPE 2 DIABETES MELLITUS (HCC): Status: ACTIVE | Noted: 2022-03-30

## 2022-03-30 PROBLEM — I10 ESSENTIAL HYPERTENSION: Status: ACTIVE | Noted: 2022-03-30

## 2022-05-05 PROBLEM — R13.10 DYSPHAGIA: Status: ACTIVE | Noted: 2022-05-05

## 2022-05-19 ENCOUNTER — HOSPITAL ENCOUNTER (OUTPATIENT)
Dept: ULTRASOUND IMAGING | Age: 77
Discharge: HOME | End: 2022-05-19
Attending: DIETITIAN, REGISTERED

## 2022-05-19 DIAGNOSIS — R13.10 DYSPHAGIA, UNSPECIFIED TYPE: ICD-10-CM

## 2022-05-19 DIAGNOSIS — E04.1 THYROID NODULE: ICD-10-CM

## 2022-05-23 DIAGNOSIS — E11.65 TYPE 2 DIABETES MELLITUS WITH HYPERGLYCEMIA, WITHOUT LONG-TERM CURRENT USE OF INSULIN (HCC): ICD-10-CM

## 2022-05-23 DIAGNOSIS — E78.00 HYPERCHOLESTEROLEMIA: Primary | ICD-10-CM

## 2022-05-26 ENCOUNTER — TELEPHONE (OUTPATIENT)
Dept: ENDOCRINOLOGY | Age: 77
End: 2022-05-26

## 2022-05-26 NOTE — TELEPHONE ENCOUNTER
NP contacted patient. Discussed Thyroid US results from Radiology. Instructed patient, per recommendation of Dr. Hans Byers, Endocrinology recommends Ms. Dena Morales to have FNA biopsy of Left Thyroid nodule. Patient asked why she needed to have it biopsied again. NP explained the clinical importance is primarily related to the need to exclude thyroid cancer, which is present in 4 to 6.5 percent of all thyroid nodules in nonsurgical series. Thyroid biopsy is an important component of the evaluation of a thyroid nodule. It is the most accurate method for evaluating thyroid nodules and selecting patients for thyroid surgery. Patient reports she will think about doing the biopsy and will call Endocrinology back if she would like to proceed. NP will hold referral for FNA biopsy of Left thyroid nodule until patient returns call.

## 2022-05-31 ENCOUNTER — INITIAL CONSULT (OUTPATIENT)
Dept: CARDIOLOGY CLINIC | Age: 77
End: 2022-05-31
Payer: MEDICARE

## 2022-05-31 VITALS
HEART RATE: 89 BPM | WEIGHT: 172 LBS | HEIGHT: 60 IN | SYSTOLIC BLOOD PRESSURE: 172 MMHG | DIASTOLIC BLOOD PRESSURE: 80 MMHG | BODY MASS INDEX: 33.77 KG/M2

## 2022-05-31 DIAGNOSIS — D86.9 SARCOIDOSIS: Primary | ICD-10-CM

## 2022-05-31 DIAGNOSIS — I10 ESSENTIAL HYPERTENSION: ICD-10-CM

## 2022-05-31 DIAGNOSIS — E11.65 TYPE 2 DIABETES MELLITUS WITH HYPERGLYCEMIA, WITHOUT LONG-TERM CURRENT USE OF INSULIN (HCC): ICD-10-CM

## 2022-05-31 PROCEDURE — G8427 DOCREV CUR MEDS BY ELIG CLIN: HCPCS | Performed by: INTERNAL MEDICINE

## 2022-05-31 PROCEDURE — 99214 OFFICE O/P EST MOD 30 MIN: CPT | Performed by: INTERNAL MEDICINE

## 2022-05-31 PROCEDURE — 93000 ELECTROCARDIOGRAM COMPLETE: CPT | Performed by: INTERNAL MEDICINE

## 2022-05-31 PROCEDURE — 3046F HEMOGLOBIN A1C LEVEL >9.0%: CPT | Performed by: INTERNAL MEDICINE

## 2022-05-31 PROCEDURE — 1090F PRES/ABSN URINE INCON ASSESS: CPT | Performed by: INTERNAL MEDICINE

## 2022-05-31 PROCEDURE — 2022F DILAT RTA XM EVC RTNOPTHY: CPT | Performed by: INTERNAL MEDICINE

## 2022-05-31 PROCEDURE — 4004F PT TOBACCO SCREEN RCVD TLK: CPT | Performed by: INTERNAL MEDICINE

## 2022-05-31 PROCEDURE — G8419 CALC BMI OUT NRM PARAM NOF/U: HCPCS | Performed by: INTERNAL MEDICINE

## 2022-05-31 PROCEDURE — 3017F COLORECTAL CA SCREEN DOC REV: CPT | Performed by: INTERNAL MEDICINE

## 2022-05-31 PROCEDURE — 1123F ACP DISCUSS/DSCN MKR DOCD: CPT | Performed by: INTERNAL MEDICINE

## 2022-05-31 PROCEDURE — G8400 PT W/DXA NO RESULTS DOC: HCPCS | Performed by: INTERNAL MEDICINE

## 2022-05-31 RX ORDER — DIAZEPAM 2 MG/1
2 TABLET ORAL
Qty: 1 TABLET | Refills: 0 | Status: SHIPPED | OUTPATIENT
Start: 2022-05-31 | End: 2022-06-01

## 2022-05-31 ASSESSMENT — ENCOUNTER SYMPTOMS
STRIDOR: 0
APHONIA: 0
NAIL CHANGES: 0
EYE PAIN: 0
ABDOMINAL PAIN: 0
COUGH: 0

## 2022-05-31 NOTE — PROGRESS NOTES
[address], [address] [facility], [address] [place] [address]  PHONE: [phone]    SUBJECTIVE:   Crissy Barnett is a 77 y.o. female 1945   seen for a consultation visit regarding the following:     Chief Complaint   Patient presents with   Cindy See Sarcoidosis     consult               Consultation is requested by r evaluation of Sarcoidosis (consult )   . History of present illness: 77 y.o. female sacrodis in 2018 biopsy which was nondiagnostic and was followed by a video-assisted thoracoscopic surgery with open lung biopsy and mediastinal sampling whic confirmed the sarcoidosis. 2017 mild PFTs with mild restriction. Cardiac History:  5/31/Sinus  Rhythm   Low voltage in precordial leads. Assessment:  History of sarcoidosis  · Pulmonary work-up completed last in 2017  · Moderate obstruction on PFTs  · Plan for cardiac MRI  · Patient formally seen by Dr. Nelly Tolliver and wishes to reestablish care in the future    Diabetes  · May be a candidate for SGL 2 therapy in the future  Lab Results   Component Value Date    LABA1C 10.2 (H) 02/10/2022     No results found for: EAG       Past Medical History, Past Surgical History, Family history, Social History, and Medications were all reviewed with the patient today and updated as necessary. Allergies   Allergen Reactions    Levonorgestrel-Ethinyl Estrad Other (See Comments)    Molds & Smuts Other (See Comments)     Past Medical History:   Diagnosis Date    Adverse effect of anesthesia     delayed awakening    Anxiety 11/26/2012    Bilateral tinnitus     Breast tenderness 11/26/2012    Environmental and seasonal allergies     Former smoker     Headache(784.0) 11/26/2012    Hematuria, microscopic 11/26/2012    per patient Chronic-last seen by urology 2009.      Hiatal hernia     Hyperglycemia 11/26/2012    Hemoglobin A1c 7 on 3/17/17, non-fasting glucose 177 on 7/11/17    Hyperlipidemia 11/26/2012    IBS (irritable bowel syndrome)     Osteoarthritis 2012    Sarcoidosis 2012    Thyroid nodule 2012    3 lesions     Tortuous aorta ([address]     per patient [facility] \"several years ago\"    Unilateral primary osteoarthritis, right knee     Vitamin D deficiency 2012    Weight gain 2012     Past Surgical History:   Procedure Laterality Date     SECTION      CHOLECYSTECTOMY      COLONOSCOPY  2012     repeat in 10 yrs.  COLONOSCOPY      TONSILLECTOMY  1964    TUMOR REMOVAL Left     pappilloma tumor left breast      Family History   Problem Relation Age of Onset    Stroke Mother     Diabetes Father     High Cholesterol Father     Heart Disease Father     Depression Mother     Diabetes Brother     Diabetes Brother     Thyroid Disease Mother     Anxiety Disorder Mother      Social History     Tobacco Use    Smoking status: Former Smoker     Quit date: 1977     Years since quittin.4    Smokeless tobacco: Never Used   Substance Use Topics    Alcohol use: Yes       ROS:    Review of Systems   Constitutional: Negative for fever. HENT: Negative for stridor. Eyes: Negative for pain. Cardiovascular: Negative for chest pain. Respiratory: Negative for cough. Endocrine: Negative for cold intolerance. Skin: Negative for nail changes. Musculoskeletal: Negative for arthritis. Gastrointestinal: Negative for abdominal pain. Genitourinary: Negative for dysuria. Neurological: Negative for aphonia. Psychiatric/Behavioral: Negative for altered mental status. Allergic/Immunologic: Negative for hives. PHYSICAL EXAM:   BP (!) 172/80   Pulse 89 Comment: per EKG  Ht 5' (1.524 m)   Wt 172 lb (78 kg)   BMI 33.59 kg/m²      Physical Exam  Vitals reviewed. HENT:      Head: Normocephalic. Right Ear: External ear normal.      Left Ear: External ear normal.      Nose: Nose normal.   Eyes:      General: No scleral icterus.   Pulmonary:      Effort: Pulmonary effort is normal.   Abdominal:      General: There is no distension. Musculoskeletal:      Cervical back: Neck supple. Skin:     General: Skin is warm. Neurological:      Mental Status: She is alert. Mental status is at baseline. Medical problems and test results were reviewed with the patient today. No results found for any visits on 05/31/22. Recent Results (from the past 672 hour(s))   AMB POC HEMOGLOBIN A1C    Collection Time: 05/04/22  3:15 PM   Result Value Ref Range    Hemoglobin A1C, POC 7.9 %     Lab Results   Component Value Date    CHOL 192 02/10/2022    HDL 42 02/10/2022    VLDL 62 02/10/2022            DESIRE Daniels was seen today for sarcoidosis. Diagnoses and all orders for this visit:    Sarcoidosis  -     EKG 12 lead  -     MRI CARDIAC W WO CONTRAST; Future  -     diazePAM (VALIUM) 2 MG tablet; Take 1 tablet by mouth On Call for 1 dose. Essential hypertension    Type 2 diabetes mellitus with hyperglycemia, without long-term current use of insulin ([address]      No follow-up provider specified. Thank you for allowing me to participate in this patient's care. Please call or contact me if there are any questions or concerns regarding the above.       Diane Ward MD  05/31/22  1:41 PM      Proofread, but unrecognized errors may exist.

## 2022-06-21 ENCOUNTER — OFFICE VISIT (OUTPATIENT)
Dept: FAMILY MEDICINE CLINIC | Facility: CLINIC | Age: 77
End: 2022-06-21
Payer: MEDICARE

## 2022-06-21 VITALS
HEIGHT: 60 IN | WEIGHT: 169.6 LBS | SYSTOLIC BLOOD PRESSURE: 138 MMHG | RESPIRATION RATE: 16 BRPM | OXYGEN SATURATION: 96 % | TEMPERATURE: 97.6 F | BODY MASS INDEX: 33.3 KG/M2 | HEART RATE: 74 BPM | DIASTOLIC BLOOD PRESSURE: 69 MMHG

## 2022-06-21 DIAGNOSIS — R92.1 BREAST CALCIFICATIONS: ICD-10-CM

## 2022-06-21 DIAGNOSIS — I10 ESSENTIAL HYPERTENSION: ICD-10-CM

## 2022-06-21 DIAGNOSIS — E11.65 TYPE 2 DIABETES MELLITUS WITH HYPERGLYCEMIA, WITHOUT LONG-TERM CURRENT USE OF INSULIN (HCC): Primary | ICD-10-CM

## 2022-06-21 DIAGNOSIS — E78.00 HYPERCHOLESTEROLEMIA: ICD-10-CM

## 2022-06-21 PROCEDURE — 2022F DILAT RTA XM EVC RTNOPTHY: CPT | Performed by: FAMILY MEDICINE

## 2022-06-21 PROCEDURE — 1123F ACP DISCUSS/DSCN MKR DOCD: CPT | Performed by: FAMILY MEDICINE

## 2022-06-21 PROCEDURE — 1090F PRES/ABSN URINE INCON ASSESS: CPT | Performed by: FAMILY MEDICINE

## 2022-06-21 PROCEDURE — G8427 DOCREV CUR MEDS BY ELIG CLIN: HCPCS | Performed by: FAMILY MEDICINE

## 2022-06-21 PROCEDURE — 3017F COLORECTAL CA SCREEN DOC REV: CPT | Performed by: FAMILY MEDICINE

## 2022-06-21 PROCEDURE — 1036F TOBACCO NON-USER: CPT | Performed by: FAMILY MEDICINE

## 2022-06-21 PROCEDURE — G8417 CALC BMI ABV UP PARAM F/U: HCPCS | Performed by: FAMILY MEDICINE

## 2022-06-21 PROCEDURE — 3046F HEMOGLOBIN A1C LEVEL >9.0%: CPT | Performed by: FAMILY MEDICINE

## 2022-06-21 PROCEDURE — 99204 OFFICE O/P NEW MOD 45 MIN: CPT | Performed by: FAMILY MEDICINE

## 2022-06-21 PROCEDURE — G8400 PT W/DXA NO RESULTS DOC: HCPCS | Performed by: FAMILY MEDICINE

## 2022-06-21 RX ORDER — GLIPIZIDE 2.5 MG/1
5 TABLET, EXTENDED RELEASE ORAL DAILY
Qty: 90 TABLET | Refills: 3 | Status: SHIPPED | OUTPATIENT
Start: 2022-06-21 | End: 2022-08-01

## 2022-06-21 RX ORDER — METFORMIN HYDROCHLORIDE 500 MG/1
500 TABLET, EXTENDED RELEASE ORAL 2 TIMES DAILY WITH MEALS
Qty: 180 TABLET | Refills: 3 | Status: SHIPPED | OUTPATIENT
Start: 2022-06-21 | End: 2022-09-30

## 2022-06-21 ASSESSMENT — PATIENT HEALTH QUESTIONNAIRE - PHQ9
SUM OF ALL RESPONSES TO PHQ QUESTIONS 1-9: 0
2. FEELING DOWN, DEPRESSED OR HOPELESS: 0
SUM OF ALL RESPONSES TO PHQ QUESTIONS 1-9: 0
SUM OF ALL RESPONSES TO PHQ9 QUESTIONS 1 & 2: 0
1. LITTLE INTEREST OR PLEASURE IN DOING THINGS: 0

## 2022-06-21 ASSESSMENT — ENCOUNTER SYMPTOMS
GASTROINTESTINAL NEGATIVE: 1
RESPIRATORY NEGATIVE: 1

## 2022-06-21 NOTE — PROGRESS NOTES
Brittney Potts (:  1945) is a 77 y.o. female,New patient, here for evaluation of the following chief complaint(s):  Establish Care (patient needs diganostic mammogram with ultrasound on  both breast due to calcification )  Diabetes       ASSESSMENT/PLAN:    Andrea Vallejo was seen today for [facility] care. Diagnoses and all orders for this visit:    Type 2 diabetes mellitus with hyperglycemia, without long-term current use of insulin (HCC)  -     metFORMIN (GLUCOPHAGE-XR) 500 MG extended release tablet; Take 1 tablet by mouth 2 times daily (with meals)  -     glipiZIDE (GLUCOTROL XL) 2.5 MG extended release tablet; Take 2 tablets by mouth daily  -     Hemoglobin A1C; Future  -     Comprehensive Metabolic Panel; Future    Essential hypertension    Hypercholesterolemia    Breast calcifications  -     EUGENE DIGITAL DIAGNOSTIC W OR WO CAD BILATERAL; Future  -     US BREAST COMPLETE LEFT; Future  -     US BREAST COMPLETE RIGHT; Future      Plan:  Continue metformin 500 mg twice daily. Start glipizide 2.5 mg CR. Follow-up in 3 months. Labs 1 week before. Subjective   SUBJECTIVE/OBJECTIVE:  JONI  Bulmaro Tan is a 77 y.o. female with a hx of DMII and obesity who presents to [facility].      She saw Dr. Arlin Angelucci once as a PCP but otherwise, had she has not been following with the PCP since . Says she has been busy taking care of her , and her brother who has medical problems.     NIDDM2:  Taking metformin 500 mg BID. Refuses insulin, refuses newer medications. Formerly saw endocrinology but since she only agrees to metformin and glipizide decision made to treat this with her PCP.         history of sarcoidosis. Physicians wanted to put her on medication, but she refused. This was almost 10 years ago.     She has a history of knee replacement. She ambulates with a cane. She also sees cardiology, plan for cardiac MRI to look for sarcoid lesions on heart and lungs.         Vaccines: Covid , Tdap , Prevnar , Pneumovax , Shingrix   - declines vaccinations in general  Mammogram: not UTD, ordered  Pap: no longer indicated  C-scope: she has had three, doesn't want any more  Dexa: No recent dexa  Lipids: Up-to-date  A1c: Up-to-date           Review of Systems   Constitutional: Negative for activity change, appetite change, chills, diaphoresis, fatigue and fever. Respiratory: Negative. Cardiovascular: Negative. Gastrointestinal: Negative. Musculoskeletal: Negative. Skin: Negative. Neurological: Negative for headaches. Psychiatric/Behavioral: Negative. Objective   Physical Exam  Vitals and nursing note reviewed. Constitutional:       General: She is not in acute distress. Appearance: She is obese. She is not ill-appearing or toxic-appearing. HENT:      Right Ear: Tympanic membrane and ear canal normal.      Left Ear: Tympanic membrane and ear canal normal.   Cardiovascular:      Rate and Rhythm: Normal rate and regular rhythm. Pulmonary:      Effort: Pulmonary effort is normal.      Breath sounds: Normal breath sounds. Skin:     General: Skin is warm. Capillary Refill: Capillary refill takes less than 2 seconds. Neurological:      Mental Status: She is alert. Psychiatric:         Mood and Affect: Mood normal.         Behavior: Behavior normal.         Thought Content:  Thought content normal.         Judgment: Judgment normal.          Vitals:    06/21/22 1503   BP: 138/69   Pulse: 74   Resp: 16   Temp: 97.6 °F (36.4 °C)   SpO2: 96%     LAST A1C: 7.9        No results found for: EAG  Lab Results   Component Value Date     02/10/2022    K 4.7 02/10/2022    CL 99 02/10/2022    CO2 24 02/10/2022    BUN 20 02/10/2022    CREATININE 0.79 02/10/2022    GLUCOSE 174 (H) 04/26/2022    CALCIUM 9.5 02/10/2022    PROT 6.8 02/10/2022    LABALBU 4.6 02/10/2022    BILITOT 0.3 02/10/2022    ALKPHOS 102 02/10/2022    AST 18 02/10/2022    ALT 15 02/10/2022    GFRAA 85 02/10/2022    AGRATIO 2.1 02/10/2022         On this date 6/21/2022 I have spent 45 minutes reviewing previous notes, test results and face to face with the patient discussing the diagnosis and importance of compliance with the treatment plan as well as documenting on the day of the visit. An electronic signature was used to authenticate this note.     --Matt Bazan MD, MD

## 2022-06-22 ENCOUNTER — TELEPHONE (OUTPATIENT)
Dept: FAMILY MEDICINE CLINIC | Facility: CLINIC | Age: 77
End: 2022-06-22

## 2022-06-22 DIAGNOSIS — R92.1 BREAST CALCIFICATION SEEN ON MAMMOGRAM: Primary | ICD-10-CM

## 2022-06-22 NOTE — TELEPHONE ENCOUNTER
Daniella Robert needs new order for breast u/s on one order and needs to state bilateral breast ultrasound instead of two separate

## 2022-06-23 ENCOUNTER — HOSPITAL ENCOUNTER (OUTPATIENT)
Dept: MRI IMAGING | Age: 77
Discharge: HOME | End: 2022-06-26
Payer: MEDICARE

## 2022-06-23 DIAGNOSIS — D86.9 SARCOIDOSIS: ICD-10-CM

## 2022-06-23 PROCEDURE — A9579 GAD-BASE MR CONTRAST NOS,1ML: HCPCS | Performed by: INTERNAL MEDICINE

## 2022-06-23 PROCEDURE — 6360000004 HC RX CONTRAST MEDICATION: Performed by: INTERNAL MEDICINE

## 2022-06-23 PROCEDURE — 2580000003 HC RX 258: Performed by: INTERNAL MEDICINE

## 2022-06-23 PROCEDURE — 75561 CARDIAC MRI FOR MORPH W/DYE: CPT | Performed by: INTERNAL MEDICINE

## 2022-06-23 PROCEDURE — 75561 CARDIAC MRI FOR MORPH W/DYE: CPT

## 2022-06-23 RX ORDER — SODIUM CHLORIDE 0.9 % (FLUSH) 0.9 %
30 SYRINGE (ML) INJECTION AS NEEDED
Status: DISCONTINUED | OUTPATIENT
Start: 2022-06-23 | End: 2022-06-27 | Stop reason: HOSPADM

## 2022-06-23 RX ADMIN — GADOTERIDOL 30 ML: 279.3 INJECTION, SOLUTION INTRAVENOUS at 10:34

## 2022-06-23 RX ADMIN — SODIUM CHLORIDE, PRESERVATIVE FREE 30 ML: 5 INJECTION INTRAVENOUS at 10:34

## 2022-06-28 LAB
LV EF: 64 %
LVEF MODALITY: NORMAL

## 2022-06-30 ENCOUNTER — OFFICE VISIT (OUTPATIENT)
Dept: CARDIOLOGY CLINIC | Age: 77
End: 2022-06-30
Payer: MEDICARE

## 2022-06-30 VITALS
WEIGHT: 171.6 LBS | SYSTOLIC BLOOD PRESSURE: 138 MMHG | HEART RATE: 88 BPM | DIASTOLIC BLOOD PRESSURE: 80 MMHG | BODY MASS INDEX: 33.69 KG/M2 | HEIGHT: 60 IN

## 2022-06-30 DIAGNOSIS — E11.65 TYPE 2 DIABETES MELLITUS WITH HYPERGLYCEMIA, WITHOUT LONG-TERM CURRENT USE OF INSULIN (HCC): Primary | ICD-10-CM

## 2022-06-30 DIAGNOSIS — D86.9 SARCOIDOSIS: ICD-10-CM

## 2022-06-30 DIAGNOSIS — I10 ESSENTIAL HYPERTENSION: ICD-10-CM

## 2022-06-30 PROCEDURE — 3046F HEMOGLOBIN A1C LEVEL >9.0%: CPT | Performed by: INTERNAL MEDICINE

## 2022-06-30 PROCEDURE — 1123F ACP DISCUSS/DSCN MKR DOCD: CPT | Performed by: INTERNAL MEDICINE

## 2022-06-30 PROCEDURE — 1090F PRES/ABSN URINE INCON ASSESS: CPT | Performed by: INTERNAL MEDICINE

## 2022-06-30 PROCEDURE — 2022F DILAT RTA XM EVC RTNOPTHY: CPT | Performed by: INTERNAL MEDICINE

## 2022-06-30 PROCEDURE — G8417 CALC BMI ABV UP PARAM F/U: HCPCS | Performed by: INTERNAL MEDICINE

## 2022-06-30 PROCEDURE — 3017F COLORECTAL CA SCREEN DOC REV: CPT | Performed by: INTERNAL MEDICINE

## 2022-06-30 PROCEDURE — 1036F TOBACCO NON-USER: CPT | Performed by: INTERNAL MEDICINE

## 2022-06-30 PROCEDURE — G8400 PT W/DXA NO RESULTS DOC: HCPCS | Performed by: INTERNAL MEDICINE

## 2022-06-30 PROCEDURE — G8428 CUR MEDS NOT DOCUMENT: HCPCS | Performed by: INTERNAL MEDICINE

## 2022-06-30 PROCEDURE — 99214 OFFICE O/P EST MOD 30 MIN: CPT | Performed by: INTERNAL MEDICINE

## 2022-06-30 ASSESSMENT — ENCOUNTER SYMPTOMS
APHONIA: 0
ABDOMINAL PAIN: 0
EYE PAIN: 0
COUGH: 0
NAIL CHANGES: 0
STRIDOR: 0

## 2022-06-30 NOTE — PROGRESS NOTES
[address], [address] [facility], [address] [place] [address]  PHONE: [phone]    SUBJECTIVE:   Hafsa Goodwin is a 77 y.o. female 1945      Chief Complaint   Patient presents with    Hypertension     1 month follow up     History of present illness: 77 y.o. female sacrodis in 2018 biopsy which was nondiagnostic and was followed by a video-assisted thoracoscopic surgery with open lung biopsy and mediastinal sampling whic confirmed the sarcoidosis. 2017 mild PFTs with mild restriction. The patient presented for follow up 06/30/22. Cardiac History:  1. 5/31/Sinus  Rhythm   2. Low voltage in precordial leads. 3. 6/23/2022. Cardiac MRI ejection fraction 64% no evidence of myocardial late gadolinium enhancement. Patchy linear hyper intensities in the lung. Assessment:    History of sarcoidosis  · Pulmonary work-up completed last in 2017  · Moderate obstruction on PFTs  · Plan for cardiac MRI  · Patient formally seen by Dr. Higinio Gallegos and wishes to reestablish care in the future. · Pulmonary abnormalities noted on MRI as stated above. Diabetes  · May be a candidate for SGL 2 therapy in the future  glipiZIDE - 2.5 MG  · metFORMIN - 500 MG   Lab Results   Component Value Date    LABA1C 10.2 (H) 02/10/2022     No results found for: EAG       Past Medical History, Past Surgical History, Family history, Social History, and Medications were all reviewed with the patient today and updated as necessary. Allergies   Allergen Reactions    Levonorgestrel-Ethinyl Estrad Other (See Comments)    Molds & Smuts Other (See Comments)     Past Medical History:   Diagnosis Date    Adverse effect of anesthesia     delayed awakening    Anxiety 11/26/2012    Bilateral tinnitus     Breast tenderness 11/26/2012    Environmental and seasonal allergies     Former smoker     Headache(784.0) 11/26/2012    Hematuria, microscopic 11/26/2012    per patient Chronic-last seen by urology 2009.      Hiatal hernia     Hyperglycemia 2012    Hemoglobin A1c 7 on 3/17/17, non-fasting glucose 177 on 17    Hyperlipidemia 2012    IBS (irritable bowel syndrome)     Osteoarthritis 2012    Sarcoidosis 2012    Thyroid nodule 2012    3 lesions     Tortuous aorta ([address]     per patient seen at [facility] \"several years ago\"    Unilateral primary osteoarthritis, right knee     Vitamin D deficiency 2012    Weight gain 2012     Past Surgical History:   Procedure Laterality Date     SECTION      CHOLECYSTECTOMY      COLONOSCOPY  2012     repeat in 10 yrs.  COLONOSCOPY      TONSILLECTOMY  1964    TOTAL KNEE ARTHROPLASTY Right 2017    TUMOR REMOVAL Left     pappilloma tumor left breast      Family History   Problem Relation Age of Onset    Stroke Mother     Diabetes Father     High Cholesterol Father     Heart Disease Father     Depression Mother     Diabetes Brother     Diabetes Brother     Thyroid Disease Mother     Anxiety Disorder Mother      Social History     Tobacco Use    Smoking status: Former Smoker     Quit date: 1977     Years since quittin.5    Smokeless tobacco: Never Used   Substance Use Topics    Alcohol use: Yes       ROS:    Review of Systems   Constitutional: Negative for fever. HENT: Negative for stridor. Eyes: Negative for pain. Cardiovascular: Negative for chest pain. Respiratory: Negative for cough. Endocrine: Negative for cold intolerance. Skin: Negative for nail changes. Musculoskeletal: Negative for arthritis. Gastrointestinal: Negative for abdominal pain. Genitourinary: Negative for dysuria. Neurological: Negative for aphonia. Psychiatric/Behavioral: Negative for altered mental status. Allergic/Immunologic: Negative for hives. PHYSICAL EXAM:   There were no vitals taken for this visit. Physical Exam  Vitals reviewed. HENT:      Head: Normocephalic.       Right Ear: External

## 2022-07-25 ENCOUNTER — TELEPHONE (OUTPATIENT)
Dept: FAMILY MEDICINE CLINIC | Facility: CLINIC | Age: 77
End: 2022-07-25

## 2022-07-25 NOTE — TELEPHONE ENCOUNTER
Patient apparently called in requesting lab work to be done to screen her for infection. Her brother is currently hospitalized and being treated for some type of either skin or urinary infection. Noted that one of the nurses told her that he had ESBL. I suspect that he may have an infection with bacteria producing extended spectrum beta-lactamase making it resistant. Patient states that she has had close contact with this brother and wondering if she may have the same infection. She states that she is asymptomatic at this time. She does not have urinary symptoms, fever or skin symptoms. I advised her to speak with her brothers physician to get more details to see if she may be at risk for infection and if so we could arrange for labs or urinalysis if warranted. She voiced understanding.

## 2022-08-01 DIAGNOSIS — E11.65 TYPE 2 DIABETES MELLITUS WITH HYPERGLYCEMIA, WITHOUT LONG-TERM CURRENT USE OF INSULIN (HCC): ICD-10-CM

## 2022-08-01 RX ORDER — GLIPIZIDE 2.5 MG/1
5 TABLET, EXTENDED RELEASE ORAL DAILY
Qty: 90 TABLET | Refills: 3 | Status: SHIPPED | OUTPATIENT
Start: 2022-08-01 | End: 2022-09-13

## 2022-08-25 DIAGNOSIS — D86.9 SARCOIDOSIS: Primary | ICD-10-CM

## 2022-08-26 ENCOUNTER — OFFICE VISIT (OUTPATIENT)
Dept: PULMONOLOGY | Age: 77
End: 2022-08-26
Payer: MEDICARE

## 2022-08-26 ENCOUNTER — HOSPITAL ENCOUNTER (OUTPATIENT)
Dept: GENERAL RADIOLOGY | Age: 77
Discharge: HOME | End: 2022-08-29
Payer: MEDICARE

## 2022-08-26 VITALS
OXYGEN SATURATION: 97 % | WEIGHT: 167 LBS | DIASTOLIC BLOOD PRESSURE: 76 MMHG | HEART RATE: 69 BPM | TEMPERATURE: 97.3 F | HEIGHT: 60 IN | BODY MASS INDEX: 32.79 KG/M2 | SYSTOLIC BLOOD PRESSURE: 139 MMHG

## 2022-08-26 DIAGNOSIS — D86.9 SARCOIDOSIS: Primary | ICD-10-CM

## 2022-08-26 DIAGNOSIS — D86.9 SARCOIDOSIS: ICD-10-CM

## 2022-08-26 LAB
EXPIRATORY TIME: NORMAL
FEF 25-75% %PRED-PRE: NORMAL
FEF 25-75% PRED: NORMAL
FEF 25-75%-PRE: NORMAL
FEV1 %PRED-PRE: 68 %
FEV1 PRED: NORMAL
FEV1/FVC %PRED-PRE: NORMAL
FEV1/FVC PRED: NORMAL
FEV1/FVC: 72 %
FEV1: 1.19 L
FVC %PRED-PRE: 71 %
FVC PRED: NORMAL
FVC: 1.65 L
PEF %PRED-PRE: NORMAL
PEF PRED: NORMAL
PEF-PRE: NORMAL

## 2022-08-26 PROCEDURE — G8427 DOCREV CUR MEDS BY ELIG CLIN: HCPCS | Performed by: INTERNAL MEDICINE

## 2022-08-26 PROCEDURE — 94010 BREATHING CAPACITY TEST: CPT | Performed by: INTERNAL MEDICINE

## 2022-08-26 PROCEDURE — 3017F COLORECTAL CA SCREEN DOC REV: CPT | Performed by: INTERNAL MEDICINE

## 2022-08-26 PROCEDURE — G8400 PT W/DXA NO RESULTS DOC: HCPCS | Performed by: INTERNAL MEDICINE

## 2022-08-26 PROCEDURE — 1123F ACP DISCUSS/DSCN MKR DOCD: CPT | Performed by: INTERNAL MEDICINE

## 2022-08-26 PROCEDURE — G8417 CALC BMI ABV UP PARAM F/U: HCPCS | Performed by: INTERNAL MEDICINE

## 2022-08-26 PROCEDURE — 1036F TOBACCO NON-USER: CPT | Performed by: INTERNAL MEDICINE

## 2022-08-26 PROCEDURE — 1090F PRES/ABSN URINE INCON ASSESS: CPT | Performed by: INTERNAL MEDICINE

## 2022-08-26 PROCEDURE — 99204 OFFICE O/P NEW MOD 45 MIN: CPT | Performed by: INTERNAL MEDICINE

## 2022-08-26 PROCEDURE — 71046 X-RAY EXAM CHEST 2 VIEWS: CPT

## 2022-08-26 ASSESSMENT — PULMONARY FUNCTION TESTS
FEV1_PERCENT_PREDICTED_PRE: 68
FVC: 1.65
FVC_PERCENT_PREDICTED_PRE: 71
FEV1/FVC: 72
FEV1: 1.19

## 2022-08-26 NOTE — PROGRESS NOTES
DIAGNOSTIC TESTS:                            CXR:                   Spirometry:          PLAN:       Encounter Diagnoses   Name Primary? Sarcoidosis ([address]  Seemingly an active on not any therapy. The patient does have a restrictive defect on pulmonary function which is moderate and possibly related to body habitus or old sarcoidosis. At this time there is no contraindication to necessary knee surgery and with the location of the surgery being the knee the risk of postoperative and perioperative complications from the pulmonary standpoint and this patient is low. She will follow-up with us on an as-needed basis should she develop any symptoms from her breathing standpoint. At this point she is at her baseline with no complaints about her breathing system. Yes    Pre-op evaluation  Okay for surgery from the pulmonary standpoint with low risk of perioperative and postoperative complications from the pulmonary standpoint           August 26, 2022: In the interim the patient had a cardiac MRI which did not reveal any evidence of cardiac sarcoidosis. The patient has done well since I last saw her 5 years ago, she has no complaints, does not have unintentional weight loss, low-grade fevers, erythremia nodosum, uveitis, night sweats, coughing, wheezing, shortness of breath. She is in her usual state of health from the pulmonary standpoint. She tells me that in the past she would have multiple CT scans and x-rays etc. and that she is not really interested in pursuing further further testing with CT scans at this time. She did have office spirometry today which compared to previous spirometry shows stability to minimal improvement in pulmonary function. She is not on any steroids currently.   Past Medical History:   Diagnosis Date    Adverse effect of anesthesia     delayed awakening    Anxiety 11/26/2012    Bilateral tinnitus     Breast tenderness 11/26/2012    Environmental and seasonal allergies     Former smoker     Headache(784.0) 2012    Hematuria, microscopic 2012    per patient Chronic-last seen by urology . Hiatal hernia     Hyperglycemia 2012    Hemoglobin A1c 7 on 3/17/17, non-fasting glucose 177 on 17    Hyperlipidemia 2012    IBS (irritable bowel syndrome)     Osteoarthritis 2012    Sarcoidosis 2012    Thyroid nodule 2012    3 lesions     Tortuous aorta ([address]     per patient seen at [address] [place] [address] Cardiology \"several years ago\"    Unilateral primary osteoarthritis, right knee     Vitamin D deficiency 2012    Weight gain 2012         Patient Active Problem List   Diagnosis    Hematuria, microscopic    Osteoarthritis of right knee    Breast tenderness    Thyroid nodule    Hyperglycemia    Sarcoidosis    IBS (irritable bowel syndrome)    Anxiety    Osteoarthritis    Vitamin D deficiency    Status post total right knee replacement    Bilateral tinnitus    Type 2 diabetes mellitus with hyperglycemia, without long-term current use of insulin ([address]    Hypercholesterolemia    Essential hypertension    Dysphagia           Past Surgical History:   Procedure Laterality Date     SECTION      CHOLECYSTECTOMY      COLONOSCOPY  2012     repeat in 10 yrs.     COLONOSCOPY      TONSILLECTOMY  1964    TOTAL KNEE ARTHROPLASTY Right 2017    TUMOR REMOVAL Left     pappilloma tumor left breast          Social History     Socioeconomic History    Marital status:      Spouse name: Not on file    Number of children: Not on file    Years of education: Not on file    Highest education level: Not on file   Occupational History    Not on file   Tobacco Use    Smoking status: Former     Packs/day: 0.25     Years: 10.00     Pack years: 2.50     Types: Cigarettes     Quit date: 1977     Years since quittin.6    Smokeless tobacco: Never   Vaping Use    Vaping Use: Never used   Substance and Sexual Activity    Alcohol use: Yes    Drug use: No    Sexual activity: Not on file   Other Topics Concern    Not on file   Social History Narrative    Not on file     Social Determinants of Health     Financial Resource Strain: Not on file   Food Insecurity: Not on file   Transportation Needs: Not on file   Physical Activity: Not on file   Stress: Not on file   Social Connections: Not on file   Intimate Partner Violence: Not on file   Housing Stability: Not on file         Family History   Problem Relation Age of Onset    Stroke Mother     Diabetes Father     High Cholesterol Father     Heart Disease Father     Depression Mother     Diabetes Brother     Diabetes Brother     Thyroid Disease Mother     Anxiety Disorder Mother          Allergies   Allergen Reactions    Levonorgestrel-Ethinyl Estrad Other (See Comments)    Molds & Smuts Other (See Comments)    Pollen Extract Other (See Comments)    Seasonal Other (See Comments)         Current Outpatient Medications   Medication Sig    glipiZIDE (GLUCOTROL XL) 2.5 MG extended release tablet TAKE 2 TABLETS BY MOUTH DAILY    metFORMIN (GLUCOPHAGE-XR) 500 MG extended release tablet Take 1 tablet by mouth 2 times daily (with meals)     No current facility-administered medications for this visit. Review of Systems          PHYSICAL EXAM:    Vitals:    08/26/22 1431   BP: 139/76   Pulse: 69   Temp: 97.3 °F (36.3 °C)   SpO2: 97%        GENERAL APPEARANCE:   The patient is normal weight and in no respiratory distress. HEENT:   PERRL. Conjunctivae unremarkable. Nasal mucosa is without epistaxis, exudate, or polyps. Gums and dentition are unremarkable. There is no oropharyngeal narrowing. TMs are clear. NECK/LYMPHATIC:   Symmetrical with no elevation of jugular venous pulsation. Trachea midline. No thyroid enlargement. No cervical adenopathy. LUNGS:   Normal respiratory effort with symmetrical lung expansion.    Breath sounds clear to auscultation bilaterally with no rhonchi wheezing or crackles noted.   HEART:   There is a regular rate and rhythm. No murmur, rub, or gallop. There is no edema in the lower extremities. ABDOMEN:   Soft and non-tender. No hepatosplenomegaly. Bowel sounds are normal.     SKIN:   There are no rashes, cyanosis, jaundice, or ecchymosis present. EXTREMITIES:   The extremities are unremarkable without clubbing, cyanosis, joint inflammation, degenerative, or ischemic change. MUSCULOSKELETAL:   There is no abnormal tone, muscle atrophy, or abnormal movement present. NEURO:   The patient is alert and oriented to person, place, and time. Memory appears intact and mood is normal.  No gross sensorimotor deficits are present. DIAGNOSTIC TESTS:   CT of chest:       CXR: 08/26/2022           Spirometry:  08/26/2022                ASSESSMENT:  (Medical Decision Making)       Patient Active Problem List   Diagnosis    Hematuria, microscopic    Osteoarthritis of right knee    Breast tenderness    Thyroid nodule    Hyperglycemia    Sarcoidosis    IBS (irritable bowel syndrome)    Anxiety    Osteoarthritis    Vitamin D deficiency    Status post total right knee replacement    Bilateral tinnitus    Type 2 diabetes mellitus with hyperglycemia, without long-term current use of insulin ([facility] [address]    Hypercholesterolemia    Essential hypertension    Dysphagia           PLAN:  Encounter Diagnosis   Name Primary? Sarcoidosis Yes   Clinically the patient has inactive sarcoidosis and does not really need further work-up, I suspect that the ill-defined density seen on the right is related to sarcoidosis as is the one on the left, please note that the patient also had an open lung biopsy to determine the diagnosis of sarcoidosis and ultimately did not require any treatment. Cardiac MRI was negative for sarcoidosis as well.   The patient will be seen on a yearly basis with office spirometry, should she develop worsening symptoms then we will pursue CT scanning of the chest complete pulmonary function testing etc. to determine whether treatment with steroids is necessary as of now over the past year her sarcoidosis has remained inactive or minimally active. Any questions asked were answered seemingly to her satisfaction, she tells me that her knee replacement on the right went well but she still has significant pain in that knee and tells me that her left knee is very damaged and she is due to see an orthopedic doctor in order to see whether it can be replaced or needs to be replaced. If there is any need for this again there is no absolute contraindication for orthopedic surgery from the pulmonary standpoint the patient should undergo appropriate DVT prophylaxis in the postoperative period to avoid DVT but otherwise there is no contraindication from the pulmonary standpoint for surgery. The patient will therefore return to the office for follow-up in 1 year with repeat office spirometry and call the office sooner should she experience worsening symptoms. Total time spent on reviewing previous records, Dr. Christy Wells records, talking to the patient reviewing her pulmonary function testing and interpreting them as well as her imaging 50 minutes    Orders Placed This Encounter   Procedures    Spirometry Without Bronchodilator       No orders of the defined types were placed in this encounter. Lilliam Melissa MD    Dictated using voice recognition software.  Proofread, but unrecognized voice recognition errors may exist.

## 2022-09-13 DIAGNOSIS — E11.65 TYPE 2 DIABETES MELLITUS WITH HYPERGLYCEMIA, WITHOUT LONG-TERM CURRENT USE OF INSULIN (HCC): ICD-10-CM

## 2022-09-13 RX ORDER — GLIPIZIDE 2.5 MG/1
5 TABLET, EXTENDED RELEASE ORAL DAILY
Qty: 90 TABLET | Refills: 3 | Status: SHIPPED | OUTPATIENT
Start: 2022-09-13 | End: 2022-09-30 | Stop reason: SINTOL

## 2022-09-16 ENCOUNTER — NURSE ONLY (OUTPATIENT)
Dept: FAMILY MEDICINE CLINIC | Facility: CLINIC | Age: 77
End: 2022-09-16

## 2022-09-16 DIAGNOSIS — E11.65 TYPE 2 DIABETES MELLITUS WITH HYPERGLYCEMIA, WITHOUT LONG-TERM CURRENT USE OF INSULIN (HCC): ICD-10-CM

## 2022-09-16 LAB
ALBUMIN SERPL-MCNC: 4.1 G/DL (ref 3.2–4.6)
ALBUMIN/GLOB SERPL: 1.6 (ref 1.2–3.5)
ALP SERPL-CCNC: 80 U/L (ref 50–136)
ALT SERPL-CCNC: 23 U/L (ref 12–65)
ANION GAP SERPL CALC-SCNC: 5 MMOL/L (ref 4–13)
AST SERPL-CCNC: 11 U/L (ref 15–37)
BILIRUB SERPL-MCNC: 0.5 MG/DL (ref 0.2–1.1)
BUN SERPL-MCNC: 13 MG/DL (ref 8–23)
CALCIUM SERPL-MCNC: 9.7 MG/DL (ref 8.3–10.4)
CHLORIDE SERPL-SCNC: 105 MMOL/L (ref 101–110)
CO2 SERPL-SCNC: 27 MMOL/L (ref 21–32)
CREAT SERPL-MCNC: 0.8 MG/DL (ref 0.6–1)
EST. AVERAGE GLUCOSE BLD GHB EST-MCNC: 194 MG/DL
GLOBULIN SER CALC-MCNC: 2.6 G/DL (ref 2.3–3.5)
GLUCOSE SERPL-MCNC: 220 MG/DL (ref 65–100)
HBA1C MFR BLD: 8.4 % (ref 4.8–5.6)
POTASSIUM SERPL-SCNC: 4.8 MMOL/L (ref 3.5–5.1)
PROT SERPL-MCNC: 6.7 G/DL (ref 6.3–8.2)
SODIUM SERPL-SCNC: 137 MMOL/L (ref 136–145)

## 2022-09-27 ENCOUNTER — APPOINTMENT (OUTPATIENT)
Dept: MAMMOGRAPHY | Age: 77
End: 2022-09-27
Payer: MEDICARE

## 2022-09-27 ENCOUNTER — HOSPITAL ENCOUNTER (OUTPATIENT)
Dept: MAMMOGRAPHY | Age: 77
Discharge: HOME | End: 2022-09-30
Payer: MEDICARE

## 2022-09-27 DIAGNOSIS — R92.1 BREAST CALCIFICATIONS: ICD-10-CM

## 2022-09-27 DIAGNOSIS — R92.1 BREAST CALCIFICATION SEEN ON MAMMOGRAM: ICD-10-CM

## 2022-09-27 PROCEDURE — 77066 DX MAMMO INCL CAD BI: CPT

## 2022-09-30 ENCOUNTER — OFFICE VISIT (OUTPATIENT)
Dept: FAMILY MEDICINE CLINIC | Facility: CLINIC | Age: 77
End: 2022-09-30
Payer: MEDICARE

## 2022-09-30 VITALS
BODY MASS INDEX: 32.34 KG/M2 | OXYGEN SATURATION: 95 % | WEIGHT: 164.7 LBS | HEIGHT: 60 IN | DIASTOLIC BLOOD PRESSURE: 72 MMHG | TEMPERATURE: 97.1 F | HEART RATE: 75 BPM | SYSTOLIC BLOOD PRESSURE: 128 MMHG | RESPIRATION RATE: 16 BRPM

## 2022-09-30 DIAGNOSIS — I10 ESSENTIAL HYPERTENSION: Primary | ICD-10-CM

## 2022-09-30 DIAGNOSIS — E11.65 TYPE 2 DIABETES MELLITUS WITH HYPERGLYCEMIA, WITHOUT LONG-TERM CURRENT USE OF INSULIN (HCC): ICD-10-CM

## 2022-09-30 DIAGNOSIS — Z00.00 MEDICARE ANNUAL WELLNESS VISIT, SUBSEQUENT: ICD-10-CM

## 2022-09-30 PROCEDURE — 3052F HG A1C>EQUAL 8.0%<EQUAL 9.0%: CPT | Performed by: FAMILY MEDICINE

## 2022-09-30 PROCEDURE — 1123F ACP DISCUSS/DSCN MKR DOCD: CPT | Performed by: FAMILY MEDICINE

## 2022-09-30 PROCEDURE — 3017F COLORECTAL CA SCREEN DOC REV: CPT | Performed by: FAMILY MEDICINE

## 2022-09-30 PROCEDURE — G0439 PPPS, SUBSEQ VISIT: HCPCS | Performed by: FAMILY MEDICINE

## 2022-09-30 SDOH — ECONOMIC STABILITY: FOOD INSECURITY: WITHIN THE PAST 12 MONTHS, YOU WORRIED THAT YOUR FOOD WOULD RUN OUT BEFORE YOU GOT MONEY TO BUY MORE.: NEVER TRUE

## 2022-09-30 SDOH — ECONOMIC STABILITY: FOOD INSECURITY: WITHIN THE PAST 12 MONTHS, THE FOOD YOU BOUGHT JUST DIDN'T LAST AND YOU DIDN'T HAVE MONEY TO GET MORE.: NEVER TRUE

## 2022-09-30 ASSESSMENT — PATIENT HEALTH QUESTIONNAIRE - PHQ9
1. LITTLE INTEREST OR PLEASURE IN DOING THINGS: 0
SUM OF ALL RESPONSES TO PHQ QUESTIONS 1-9: 0
SUM OF ALL RESPONSES TO PHQ QUESTIONS 1-9: 0
2. FEELING DOWN, DEPRESSED OR HOPELESS: 0
SUM OF ALL RESPONSES TO PHQ QUESTIONS 1-9: 0
SUM OF ALL RESPONSES TO PHQ9 QUESTIONS 1 & 2: 0
SUM OF ALL RESPONSES TO PHQ QUESTIONS 1-9: 0

## 2022-09-30 ASSESSMENT — SOCIAL DETERMINANTS OF HEALTH (SDOH): HOW HARD IS IT FOR YOU TO PAY FOR THE VERY BASICS LIKE FOOD, HOUSING, MEDICAL CARE, AND HEATING?: NOT HARD AT ALL

## 2022-09-30 ASSESSMENT — LIFESTYLE VARIABLES
HOW MANY STANDARD DRINKS CONTAINING ALCOHOL DO YOU HAVE ON A TYPICAL DAY: PATIENT DOES NOT DRINK
HOW OFTEN DO YOU HAVE A DRINK CONTAINING ALCOHOL: NEVER

## 2022-09-30 NOTE — PATIENT INSTRUCTIONS
Personalized Preventive Plan for Isabel Schneider - 9/30/2022  Medicare offers a range of preventive health benefits. Some of the tests and screenings are paid in full while other may be subject to a deductible, co-insurance, and/or copay. Some of these benefits include a comprehensive review of your medical history including lifestyle, illnesses that may run in your family, and various assessments and screenings as appropriate. After reviewing your medical record and screening and assessments performed today your provider may have ordered immunizations, labs, imaging, and/or referrals for you. A list of these orders (if applicable) as well as your Preventive Care list are included within your After Visit Summary for your review. Other Preventive Recommendations:    A preventive eye exam performed by an eye specialist is recommended every 1-2 years to screen for glaucoma; cataracts, macular degeneration, and other eye disorders. A preventive dental visit is recommended every 6 months. Try to get at least 150 minutes of exercise per week or 10,000 steps per day on a pedometer . Order or download the FREE \"Exercise & Physical Activity: Your Everyday Guide\" from [employer]. Call 0-962.319.2420 or search [employer] online. You need 1068-7489 mg of calcium and 7583-9430 IU of vitamin D per day. It is possible to meet your calcium requirement with diet alone, but a vitamin D supplement is usually necessary to meet this goal.  When exposed to the sun, use a sunscreen that protects against both UVA and UVB radiation with an SPF of 30 or greater. Reapply every 2 to 3 hours or after sweating, drying off with a towel, or swimming. Always wear a seat belt when traveling in a car. Always wear a helmet when riding a bicycle or motorcycle.

## 2022-09-30 NOTE — PROGRESS NOTES
Medicare Annual Wellness Visit    Jarvis Cordero is here for Medicare AWV (Metformin and glipizide are giving patient diarrhea ), Knee Pain (From toes to knee left leg is giving out and giving patient trouble ), and Other (Patient declines Colonoscopy )    Assessment & Plan   Essential hypertension  Type 2 diabetes mellitus with hyperglycemia, without long-term current use of insulin ([facility] [address]  -     Comprehensive Metabolic Panel; Future  -     Hemoglobin A1C; Future  -     metFORMIN (GLUCOPHAGE) 500 MG tablet; Take 0.5 tablets by mouth 3 times daily, Disp-180 tablet, R-1Normal  Medicare annual wellness visit, subsequent      Plan:  Cut the metformin to 250 mg TID so she may tolerate better without diarrhea. Follow up in 4 months; labs one week before. Aspen Berry is a 77 y.o. female with a hx of NIDDM2, sarcoidosis, and obesity           Says she has been busy taking care of her , who has medical problems. NIDDM2:  Taking metformin 500 mg BID. Refuses insulin, refuses newer medications. Formerly saw endocrinology but since she only agrees to metformin and glipizide; she made decision to treat this with her PCP. Reportrs that she is Unable to take metformin lately like she should due to loss of family member. Last month She stopped glipizide due to side effects. Anxiety:  No longer takes benzodiazepine medication for anxiety because she is worried that it will bring on dementia.   somewhat controlled now with CBD. history of sarcoidosis. Physicians wanted to put her on medication, but she refused. This was almost 10 years ago. She also saw cardiology, negative cardiac MRI for sarcoid lesions on heart and lungs. Sees pulm every 12 months; sarcoid stable.         Vaccines: Covid , Tdap , Prevnar , Pneumovax , Shingrix   - declines vaccinations in general  Mammogram: not UTD, ordered  Pap: no longer indicated  C-scope: she has had three, doesn't want any more  Dexa: No recent dexa  Lipids: Up-to-date  A1c: Up-to-date      Patient's complete Health Risk Assessment and screening values have been reviewed and are found in Flowsheets. The following problems were reviewed today and where indicated follow up appointments were made and/or referrals ordered.     Positive Risk Factor Screenings with Interventions:    Fall Risk:  Do you feel unsteady or are you worried about falling? : (!) yes  2 or more falls in past year?: no  Fall with injury in past year?: no   Fall Risk Interventions:    Home safety tips provided            General Health and ACP:  General  In general, how would you say your health is?: Fair  In the past 7 days, have you experienced any of the following: New or Increased Pain, New or Increased Fatigue, Loneliness, Social Isolation, Stress or Anger?: No  Do you get the social and emotional support that you need?: (!) No  Do you have a Living Will?: (!) No    Advance Directives       Power of  Living Will ACP-Advance Directive ACP-Power of     Not on File Not on File Not on File Not on File          General Health Risk Interventions:  Poor self-assessment of health status: patient declines any further evaluation/treatment for this issue  none    Health Habits/Nutrition:  Physical Activity: Inactive    Days of Exercise per Week: 0 days    Minutes of Exercise per Session: 0 min     Have you lost any weight without trying in the past 3 months?: No  Body mass index: (!) 32.16  Have you seen the dentist within the past year?: Yes  Health Habits/Nutrition Interventions:  Inadequate physical activity:  patient is not ready to increase his/her physical activity level at this time knee pain    Hearing/Vision:  Do you or your family notice any trouble with your hearing that hasn't been managed with hearing aids?: (!) Yes  Do you have difficulty driving, watching TV, or doing any of your daily activities because of your eyesight?: No  Have you had an eye exam within the past year?: Yes  No results found. Hearing/Vision Interventions:  Hearing concerns:  patient declines any further evaluation/treatment for hearing issues    Safety:  Do you have working smoke detectors?: Yes  Do you have any tripping hazards - loose or unsecured carpets or rugs?: No  Do you have any tripping hazards - clutter in doorways, halls, or stairs?: No  Do you have either shower bars, grab bars, non-slip mats or non-slip surfaces in your shower or bathtub?: (!) No  Do all of your stairways have a railing or banister?: Yes  Do you always fasten your seatbelt when you are in a car?: Yes  Safety Interventions:  Home safety tips provided    ADLs:  In the past 7 days, did you need help from others to perform any of the following everyday activities: Eating, dressing, grooming, bathing, toileting, or walking/balance?: No  In the past 7 days, did you need help from others to take care of any of the following: Laundry, housekeeping, banking/finances, shopping, telephone use, food preparation, transportation, or taking medications?: (!) Yes  Select all that apply: (!) Housekeeping  ADL Interventions:  Patient declines any further evaluation/treatment for this issue          Objective   Vitals:    09/30/22 0943   BP: 128/72   Pulse: 75   Resp: 16   Temp: 97.1 °F (36.2 °C)   TempSrc: Temporal   SpO2: 95%   Weight: 164 lb 11.2 oz (74.7 kg)   Height: 5' (1.524 m)      Body mass index is 32.17 kg/m². Physical Exam  Vitals and nursing note reviewed. Constitutional:       General: She is not in acute distress. Appearance: She is not ill-appearing or toxic-appearing. Cardiovascular:      Rate and Rhythm: Normal rate and regular rhythm. Pulmonary:      Effort: Pulmonary effort is normal.      Breath sounds: Normal breath sounds. Neurological:      General: No focal deficit present. Mental Status: She is alert and oriented to person, place, and time.    Psychiatric:         Mood and Affect: Mood normal.         Behavior: Behavior normal.         Thought Content: Thought content normal.         Judgment: Judgment normal.     C  Lab Results   Component Value Date     09/16/2022    K 4.8 09/16/2022     09/16/2022    CO2 27 09/16/2022    BUN 13 09/16/2022    CREATININE 0.80 09/16/2022    GLUCOSE 220 (H) 09/16/2022    CALCIUM 9.7 09/16/2022    PROT 6.7 09/16/2022    LABALBU 4.1 09/16/2022    BILITOT 0.5 09/16/2022    ALKPHOS 80 09/16/2022    AST 11 (L) 09/16/2022    ALT 23 09/16/2022    LABGLOM >60 09/16/2022    GFRAA >60 09/16/2022    AGRATIO 2.1 02/10/2022    GLOB 2.6 09/16/2022       Lab Results   Component Value Date    CHOL 192 02/10/2022     Lab Results   Component Value Date    TRIG 377 (H) 02/10/2022     Lab Results   Component Value Date    HDL 42 02/10/2022     Lab Results   Component Value Date    LDLCALC 88 02/10/2022     Lab Results   Component Value Date    VLDL 62 (H) 02/10/2022     No results found for: CHOLHDLRATIO        Allergies   Allergen Reactions    Levonorgestrel-Ethinyl Estrad Other (See Comments)    Molds & Smuts Other (See Comments)    Pollen Extract Other (See Comments)    Seasonal Other (See Comments)     Prior to Visit Medications    Medication Sig Taking?  Authorizing Provider   metFORMIN (GLUCOPHAGE) 500 MG tablet Take 0.5 tablets by mouth 3 times daily Yes Anne Flores MD       [facility] (Including outside providers/suppliers regularly involved in providing care):   Patient Care Team:  Anne Flores MD as PCP - General (Family Medicine)  Anne Flores MD as PCP - [facility] Empaneled Provider  Tammi Frausto MD as Physician  Dr. Gloria Donato (saw once)  Dr. Franks (pul) (restablished care last month) (see yearly)   Reviewed and updated this visit:  Tobacco  Allergies  Meds  Med Hx  Surg Hx  Soc Hx  Fam Hx

## 2022-10-30 PROBLEM — Z00.00 MEDICARE ANNUAL WELLNESS VISIT, SUBSEQUENT: Status: RESOLVED | Noted: 2022-09-30 | Resolved: 2022-10-30

## 2022-12-26 DIAGNOSIS — E11.65 TYPE 2 DIABETES MELLITUS WITH HYPERGLYCEMIA (HCC): ICD-10-CM

## 2022-12-27 RX ORDER — GLIPIZIDE 5 MG/1
TABLET ORAL
Qty: 180 TABLET | OUTPATIENT
Start: 2022-12-27

## 2023-05-16 ENCOUNTER — TELEPHONE (OUTPATIENT)
Age: 78
End: 2023-05-16

## 2023-05-16 NOTE — TELEPHONE ENCOUNTER
----- Message from Radha Valle MD sent at 6/30/2022  7:25 AM EDT -----  Please offer patient yearly appointment in June 2023

## 2023-06-29 ENCOUNTER — OFFICE VISIT (OUTPATIENT)
Dept: FAMILY MEDICINE CLINIC | Facility: CLINIC | Age: 78
End: 2023-06-29
Payer: MEDICARE

## 2023-06-29 VITALS
DIASTOLIC BLOOD PRESSURE: 70 MMHG | SYSTOLIC BLOOD PRESSURE: 128 MMHG | HEIGHT: 61 IN | HEART RATE: 76 BPM | OXYGEN SATURATION: 96 % | WEIGHT: 169.2 LBS | BODY MASS INDEX: 31.95 KG/M2

## 2023-06-29 DIAGNOSIS — E78.00 HYPERCHOLESTEROLEMIA: ICD-10-CM

## 2023-06-29 DIAGNOSIS — Z11.59 NEED FOR HEPATITIS C SCREENING TEST: ICD-10-CM

## 2023-06-29 DIAGNOSIS — E11.65 TYPE 2 DIABETES MELLITUS WITH HYPERGLYCEMIA, WITHOUT LONG-TERM CURRENT USE OF INSULIN (HCC): Primary | ICD-10-CM

## 2023-06-29 DIAGNOSIS — I10 ESSENTIAL HYPERTENSION: ICD-10-CM

## 2023-06-29 PROBLEM — E11.8 DISORDER ASSOCIATED WITH TYPE 2 DIABETES MELLITUS (HCC): Status: ACTIVE | Noted: 2023-06-29

## 2023-06-29 LAB
BASOPHILS # BLD: 0.1 K/UL (ref 0–0.2)
BASOPHILS NFR BLD: 2 % (ref 0–2)
DIFFERENTIAL METHOD BLD: NORMAL
EOSINOPHIL # BLD: 0.2 K/UL (ref 0–0.8)
EOSINOPHIL NFR BLD: 3 % (ref 0.5–7.8)
ERYTHROCYTE [DISTWIDTH] IN BLOOD BY AUTOMATED COUNT: 13.1 % (ref 11.9–14.6)
HCT VFR BLD AUTO: 46.3 % (ref 35.8–46.3)
HGB BLD-MCNC: 15 G/DL (ref 11.7–15.4)
IMM GRANULOCYTES # BLD AUTO: 0 K/UL (ref 0–0.5)
IMM GRANULOCYTES NFR BLD AUTO: 0 % (ref 0–5)
LYMPHOCYTES # BLD: 3.1 K/UL (ref 0.5–4.6)
LYMPHOCYTES NFR BLD: 38 % (ref 13–44)
MCH RBC QN AUTO: 30.5 PG (ref 26.1–32.9)
MCHC RBC AUTO-ENTMCNC: 32.4 G/DL (ref 31.4–35)
MCV RBC AUTO: 94.3 FL (ref 82–102)
MONOCYTES # BLD: 0.6 K/UL (ref 0.1–1.3)
MONOCYTES NFR BLD: 8 % (ref 4–12)
NEUTS SEG # BLD: 4.1 K/UL (ref 1.7–8.2)
NEUTS SEG NFR BLD: 49 % (ref 43–78)
NRBC # BLD: 0 K/UL (ref 0–0.2)
PLATELET # BLD AUTO: 206 K/UL (ref 150–450)
PMV BLD AUTO: 10.1 FL (ref 9.4–12.3)
RBC # BLD AUTO: 4.91 M/UL (ref 4.05–5.2)
WBC # BLD AUTO: 8.2 K/UL (ref 4.3–11.1)

## 2023-06-29 PROCEDURE — G8400 PT W/DXA NO RESULTS DOC: HCPCS | Performed by: FAMILY MEDICINE

## 2023-06-29 PROCEDURE — 1090F PRES/ABSN URINE INCON ASSESS: CPT | Performed by: FAMILY MEDICINE

## 2023-06-29 PROCEDURE — G8417 CALC BMI ABV UP PARAM F/U: HCPCS | Performed by: FAMILY MEDICINE

## 2023-06-29 PROCEDURE — 1123F ACP DISCUSS/DSCN MKR DOCD: CPT | Performed by: FAMILY MEDICINE

## 2023-06-29 PROCEDURE — 99214 OFFICE O/P EST MOD 30 MIN: CPT | Performed by: FAMILY MEDICINE

## 2023-06-29 PROCEDURE — 3078F DIAST BP <80 MM HG: CPT | Performed by: FAMILY MEDICINE

## 2023-06-29 PROCEDURE — 3074F SYST BP LT 130 MM HG: CPT | Performed by: FAMILY MEDICINE

## 2023-06-29 PROCEDURE — 1036F TOBACCO NON-USER: CPT | Performed by: FAMILY MEDICINE

## 2023-06-29 PROCEDURE — G8427 DOCREV CUR MEDS BY ELIG CLIN: HCPCS | Performed by: FAMILY MEDICINE

## 2023-06-29 RX ORDER — SEMAGLUTIDE 1.34 MG/ML
INJECTION, SOLUTION SUBCUTANEOUS
Qty: 2 ADJUSTABLE DOSE PRE-FILLED PEN SYRINGE | Refills: 5 | Status: SHIPPED | OUTPATIENT
Start: 2023-06-29 | End: 2023-06-30

## 2023-06-29 SDOH — ECONOMIC STABILITY: INCOME INSECURITY: HOW HARD IS IT FOR YOU TO PAY FOR THE VERY BASICS LIKE FOOD, HOUSING, MEDICAL CARE, AND HEATING?: NOT HARD AT ALL

## 2023-06-29 SDOH — ECONOMIC STABILITY: HOUSING INSECURITY
IN THE LAST 12 MONTHS, WAS THERE A TIME WHEN YOU DID NOT HAVE A STEADY PLACE TO SLEEP OR SLEPT IN A SHELTER (INCLUDING NOW)?: NO

## 2023-06-29 SDOH — ECONOMIC STABILITY: FOOD INSECURITY: WITHIN THE PAST 12 MONTHS, YOU WORRIED THAT YOUR FOOD WOULD RUN OUT BEFORE YOU GOT MONEY TO BUY MORE.: NEVER TRUE

## 2023-06-29 SDOH — ECONOMIC STABILITY: FOOD INSECURITY: WITHIN THE PAST 12 MONTHS, THE FOOD YOU BOUGHT JUST DIDN'T LAST AND YOU DIDN'T HAVE MONEY TO GET MORE.: NEVER TRUE

## 2023-06-29 ASSESSMENT — PATIENT HEALTH QUESTIONNAIRE - PHQ9
9. THOUGHTS THAT YOU WOULD BE BETTER OFF DEAD, OR OF HURTING YOURSELF: 0
8. MOVING OR SPEAKING SO SLOWLY THAT OTHER PEOPLE COULD HAVE NOTICED. OR THE OPPOSITE, BEING SO FIGETY OR RESTLESS THAT YOU HAVE BEEN MOVING AROUND A LOT MORE THAN USUAL: 0
SUM OF ALL RESPONSES TO PHQ QUESTIONS 1-9: 9
4. FEELING TIRED OR HAVING LITTLE ENERGY: 2
SUM OF ALL RESPONSES TO PHQ9 QUESTIONS 1 & 2: 4
6. FEELING BAD ABOUT YOURSELF - OR THAT YOU ARE A FAILURE OR HAVE LET YOURSELF OR YOUR FAMILY DOWN: 0
5. POOR APPETITE OR OVEREATING: 1
SUM OF ALL RESPONSES TO PHQ QUESTIONS 1-9: 9
10. IF YOU CHECKED OFF ANY PROBLEMS, HOW DIFFICULT HAVE THESE PROBLEMS MADE IT FOR YOU TO DO YOUR WORK, TAKE CARE OF THINGS AT HOME, OR GET ALONG WITH OTHER PEOPLE: 1
SUM OF ALL RESPONSES TO PHQ QUESTIONS 1-9: 9
7. TROUBLE CONCENTRATING ON THINGS, SUCH AS READING THE NEWSPAPER OR WATCHING TELEVISION: 0
2. FEELING DOWN, DEPRESSED OR HOPELESS: 3
1. LITTLE INTEREST OR PLEASURE IN DOING THINGS: 1
SUM OF ALL RESPONSES TO PHQ QUESTIONS 1-9: 9
3. TROUBLE FALLING OR STAYING ASLEEP: 2

## 2023-06-29 ASSESSMENT — ENCOUNTER SYMPTOMS: RESPIRATORY NEGATIVE: 1

## 2023-06-30 DIAGNOSIS — E11.65 TYPE 2 DIABETES MELLITUS WITH HYPERGLYCEMIA, WITHOUT LONG-TERM CURRENT USE OF INSULIN (HCC): Primary | ICD-10-CM

## 2023-06-30 LAB
ALBUMIN SERPL-MCNC: 4.1 G/DL (ref 3.2–4.6)
ALBUMIN/GLOB SERPL: 1.4 (ref 0.4–1.6)
ALP SERPL-CCNC: 127 U/L (ref 50–136)
ALT SERPL-CCNC: 27 U/L (ref 12–65)
ANION GAP SERPL CALC-SCNC: 8 MMOL/L (ref 2–11)
AST SERPL-CCNC: 10 U/L (ref 15–37)
BILIRUB SERPL-MCNC: 0.2 MG/DL (ref 0.2–1.1)
BUN SERPL-MCNC: 17 MG/DL (ref 8–23)
CALCIUM SERPL-MCNC: 9.7 MG/DL (ref 8.3–10.4)
CHLORIDE SERPL-SCNC: 101 MMOL/L (ref 101–110)
CHOLEST SERPL-MCNC: 187 MG/DL
CO2 SERPL-SCNC: 25 MMOL/L (ref 21–32)
CREAT SERPL-MCNC: 1 MG/DL (ref 0.6–1)
CREAT UR-MCNC: 78 MG/DL
EST. AVERAGE GLUCOSE BLD GHB EST-MCNC: 272 MG/DL
GLOBULIN SER CALC-MCNC: 3 G/DL (ref 2.8–4.5)
GLUCOSE SERPL-MCNC: 363 MG/DL (ref 65–100)
HBA1C MFR BLD: 11.1 % (ref 4.8–5.6)
HCV AB SER QL: NONREACTIVE
HDLC SERPL-MCNC: 49 MG/DL (ref 40–60)
HDLC SERPL: 3.8
LDLC SERPL CALC-MCNC: 62.4 MG/DL
MICROALBUMIN UR-MCNC: 0.64 MG/DL
MICROALBUMIN/CREAT UR-RTO: 8 MG/G (ref 0–30)
POTASSIUM SERPL-SCNC: 4.5 MMOL/L (ref 3.5–5.1)
PROT SERPL-MCNC: 7.1 G/DL (ref 6.3–8.2)
SODIUM SERPL-SCNC: 134 MMOL/L (ref 133–143)
TRIGL SERPL-MCNC: 378 MG/DL (ref 35–150)
VLDLC SERPL CALC-MCNC: 75.6 MG/DL (ref 6–23)

## 2023-07-17 ENCOUNTER — TELEPHONE (OUTPATIENT)
Dept: FAMILY MEDICINE CLINIC | Facility: CLINIC | Age: 78
End: 2023-07-17

## 2023-07-17 NOTE — TELEPHONE ENCOUNTER
Patient called and stated she agreed to insulin but would like in pen form not syringe.  Please call patient to advise

## 2023-07-17 NOTE — TELEPHONE ENCOUNTER
Patient contacted. Reports speaking with a friend and was told that insulin is actually better for her than the Metformin. Reported that friend told her Metformin goes through her liver but insulin will not. Wanting to confirm if this is true. Please advise. Patient also wanting to know if she starts on insulin if this is something she can discontinue in the future. Reports cutting back on portion size and watching what she is eating. Please advise. Patient willing to try insulin but not wanting the vials. Please advise.

## 2023-07-18 NOTE — TELEPHONE ENCOUNTER
Patient contacted and scheduled appointment for tomorrow at 2 PM. Patient voiced understanding and thanks.

## 2023-07-19 ENCOUNTER — OFFICE VISIT (OUTPATIENT)
Dept: FAMILY MEDICINE CLINIC | Facility: CLINIC | Age: 78
End: 2023-07-19
Payer: MEDICARE

## 2023-07-19 VITALS
WEIGHT: 168.4 LBS | SYSTOLIC BLOOD PRESSURE: 136 MMHG | DIASTOLIC BLOOD PRESSURE: 78 MMHG | BODY MASS INDEX: 33.06 KG/M2 | HEART RATE: 76 BPM | OXYGEN SATURATION: 99 % | HEIGHT: 60 IN

## 2023-07-19 DIAGNOSIS — E11.65 TYPE 2 DIABETES MELLITUS WITH HYPERGLYCEMIA, WITHOUT LONG-TERM CURRENT USE OF INSULIN (HCC): Primary | ICD-10-CM

## 2023-07-19 PROCEDURE — 99213 OFFICE O/P EST LOW 20 MIN: CPT | Performed by: FAMILY MEDICINE

## 2023-07-19 PROCEDURE — G8417 CALC BMI ABV UP PARAM F/U: HCPCS | Performed by: FAMILY MEDICINE

## 2023-07-19 PROCEDURE — 3078F DIAST BP <80 MM HG: CPT | Performed by: FAMILY MEDICINE

## 2023-07-19 PROCEDURE — G8427 DOCREV CUR MEDS BY ELIG CLIN: HCPCS | Performed by: FAMILY MEDICINE

## 2023-07-19 PROCEDURE — 1036F TOBACCO NON-USER: CPT | Performed by: FAMILY MEDICINE

## 2023-07-19 PROCEDURE — 3075F SYST BP GE 130 - 139MM HG: CPT | Performed by: FAMILY MEDICINE

## 2023-07-19 PROCEDURE — 1090F PRES/ABSN URINE INCON ASSESS: CPT | Performed by: FAMILY MEDICINE

## 2023-07-19 PROCEDURE — G8400 PT W/DXA NO RESULTS DOC: HCPCS | Performed by: FAMILY MEDICINE

## 2023-07-19 PROCEDURE — 1123F ACP DISCUSS/DSCN MKR DOCD: CPT | Performed by: FAMILY MEDICINE

## 2023-07-19 PROCEDURE — 3046F HEMOGLOBIN A1C LEVEL >9.0%: CPT | Performed by: FAMILY MEDICINE

## 2023-07-19 ASSESSMENT — PATIENT HEALTH QUESTIONNAIRE - PHQ9
SUM OF ALL RESPONSES TO PHQ QUESTIONS 1-9: 0
SUM OF ALL RESPONSES TO PHQ9 QUESTIONS 1 & 2: 0
SUM OF ALL RESPONSES TO PHQ QUESTIONS 1-9: 0
2. FEELING DOWN, DEPRESSED OR HOPELESS: 0
1. LITTLE INTEREST OR PLEASURE IN DOING THINGS: 0

## 2023-07-19 ASSESSMENT — ENCOUNTER SYMPTOMS: RESPIRATORY NEGATIVE: 1

## 2023-07-19 NOTE — PROGRESS NOTES
PROGRESS NOTE    SUBJECTIVE:   Radha Watkins is a 69 y.o. female seen for a follow up visit regarding   Chief Complaint   Patient presents with    Diabetes     Here to discuss insulin        HPI:  Patient comes in today to discuss pharmacotherapy options for her diabetes. Her last A1c was very high and I suggested Ozempic however she has been unable to get this due to its cost.  She reports that her cost would be $900. I then suggested insulin and she had some questions about starting insulin. In the meantime she has resumed metformin which she thought was causing diarrhea but I think she now realizes that she may have some underlying IBS and it may not be completely due to metformin. She has been tolerating her metformin and reports that her sugars are still in the 200 range. Reviewed and updated this visit by provider:  Tobacco  Allergies  Meds  Problems  Med Hx  Surg Hx  Fam Hx           Review of Systems   Respiratory: Negative. Cardiovascular: Negative. OBJECTIVE:  Vitals:    07/19/23 1358   BP: 136/78   Site: Left Upper Arm   Cuff Size: Medium Adult   Pulse: 76   SpO2: 99%   Weight: 168 lb 6.4 oz (76.4 kg)   Height: 5' (1.524 m)        Physical Exam   General: Alert, oriented, normal affect, appropriate mentation  Medical problems and test results were reviewed with the patient today.      Recent Results (from the past 672 hour(s))   Microalbumin / Creatinine Urine Ratio    Collection Time: 06/29/23  4:30 PM   Result Value Ref Range    Microalbumin, Random Urine 0.64 MG/DL    Creatinine, Ur 78.00 mg/dL    Microalbumin Creatinine Ratio 8 0 - 30 mg/g   Hepatitis C Antibody    Collection Time: 06/29/23  4:30 PM   Result Value Ref Range    Hepatitis C Ab NONREACTIVE NONREACTIVE     Lipid Panel    Collection Time: 06/29/23  4:30 PM   Result Value Ref Range    Cholesterol, Total 187 <200 MG/DL    Triglycerides 378 (H) 35 - 150 MG/DL    HDL 49 40 - 60 MG/DL    LDL Calculated 62.4

## 2023-09-28 NOTE — PROGRESS NOTES
[facility], [address] [place] [address]  [phone]    Patient Name:  Helio Egan  YOB: 1945      Office Visit 9/29/2023    CHIEF COMPLAINT:    Chief Complaint   Patient presents with    Other     Sarcoidosis       HISTORY OF PRESENT ILLNESS:    Patient is referred to us by Dr. Giulia Kilgore for reestablishment of care with sarcoidosis. Patient was last seen by me in 2017, excerpt as follows:    A delightful 20-year-old white female presents for preop evaluation for knee surgery. Her past pulmonary history significant for sarcoidosis which was diagnosed apparently in 2009. At the time she had an abnormal chest x-ray which then resulted in a needle biopsy which was nondiagnostic and was followed by a video-assisted thoracoscopic surgery with open lung biopsy and mediastinal sampling which apparently confirmed the sarcoidosis. The patient had minimal symptoms and apparently was not placed on any steroids or treatment and was followed for several years for this without complication. She stopped follow-up with pulmonary since she was asymptomatic. She is recently developed severe arthritis and requires bilateral knee replacement with the first procedure being planned shortly on the right knee. She does have night sweats for many years but states that she does not have central air conditioning in her house and that they are not drenching nights sweats. 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 She does not have shortness of breath or wheezing or coughing and although several years ago she had some nodules develop on her skin one on her eyelid and one on her thigh these have since resolved and have not recurred. There is no weight loss or fevers and she is on no respiratory medication.    DIAGNOSTIC TESTS:

## 2023-09-29 ENCOUNTER — TELEPHONE (OUTPATIENT)
Dept: FAMILY MEDICINE CLINIC | Facility: CLINIC | Age: 78
End: 2023-09-29

## 2023-09-29 ENCOUNTER — OFFICE VISIT (OUTPATIENT)
Dept: PULMONOLOGY | Age: 78
End: 2023-09-29
Payer: MEDICARE

## 2023-09-29 VITALS
SYSTOLIC BLOOD PRESSURE: 122 MMHG | TEMPERATURE: 97.4 F | OXYGEN SATURATION: 97 % | HEART RATE: 73 BPM | RESPIRATION RATE: 18 BRPM | HEIGHT: 60 IN | WEIGHT: 167 LBS | BODY MASS INDEX: 32.79 KG/M2 | DIASTOLIC BLOOD PRESSURE: 76 MMHG

## 2023-09-29 DIAGNOSIS — D86.9 SARCOIDOSIS: Primary | ICD-10-CM

## 2023-09-29 LAB
EXPIRATORY TIME: NORMAL
FEF 25-75% %PRED-PRE: NORMAL
FEF 25-75% PRED: NORMAL
FEF 25-75%-PRE: NORMAL
FEV1 %PRED-PRE: 80 %
FEV1 PRED: NORMAL
FEV1/FVC %PRED-PRE: NORMAL
FEV1/FVC PRED: NORMAL
FEV1/FVC: 75 %
FEV1: 1.36 L
FVC %PRED-PRE: 78 %
FVC PRED: NORMAL
FVC: 1.8 L
PEF %PRED-PRE: NORMAL
PEF PRED: NORMAL
PEF-PRE: NORMAL

## 2023-09-29 PROCEDURE — 3074F SYST BP LT 130 MM HG: CPT | Performed by: INTERNAL MEDICINE

## 2023-09-29 PROCEDURE — 1036F TOBACCO NON-USER: CPT | Performed by: INTERNAL MEDICINE

## 2023-09-29 PROCEDURE — 1123F ACP DISCUSS/DSCN MKR DOCD: CPT | Performed by: INTERNAL MEDICINE

## 2023-09-29 PROCEDURE — 99213 OFFICE O/P EST LOW 20 MIN: CPT | Performed by: INTERNAL MEDICINE

## 2023-09-29 PROCEDURE — G8400 PT W/DXA NO RESULTS DOC: HCPCS | Performed by: INTERNAL MEDICINE

## 2023-09-29 PROCEDURE — G8417 CALC BMI ABV UP PARAM F/U: HCPCS | Performed by: INTERNAL MEDICINE

## 2023-09-29 PROCEDURE — G8427 DOCREV CUR MEDS BY ELIG CLIN: HCPCS | Performed by: INTERNAL MEDICINE

## 2023-09-29 PROCEDURE — 1090F PRES/ABSN URINE INCON ASSESS: CPT | Performed by: INTERNAL MEDICINE

## 2023-09-29 PROCEDURE — 3078F DIAST BP <80 MM HG: CPT | Performed by: INTERNAL MEDICINE

## 2023-09-29 PROCEDURE — 94010 BREATHING CAPACITY TEST: CPT | Performed by: INTERNAL MEDICINE

## 2023-09-29 ASSESSMENT — PULMONARY FUNCTION TESTS
FVC_PERCENT_PREDICTED_PRE: 78
FEV1_PERCENT_PREDICTED_PRE: 80
FEV1/FVC: 75
FEV1: 1.36
FVC: 1.80

## 2023-09-29 NOTE — TELEPHONE ENCOUNTER
----- Message from Allen Dumont, [address] [facility] sent at 9/29/2023 10:52 AM EDT -----  Subject: Medication Problem    Medication: Other - Ozempic & Januvia  Dosage: unsure  Ordering Provider: undefined    Question/Problem: Patient has a $900 & over $1500 co-pay with these   medication sand needs an alternative.  Please call patient to discuss   further      Pharmacy: [id number], [address] [phone]    ---------------------------------------------------------------------------  --------------  Yannick SERNA  [phone]; OK to leave message on voicemail  ---------------------------------------------------------------------------  --------------    SCRIPT ANSWERS  Relationship to Patient: Self

## 2023-12-28 DIAGNOSIS — E11.65 TYPE 2 DIABETES MELLITUS WITH HYPERGLYCEMIA, WITHOUT LONG-TERM CURRENT USE OF INSULIN (HCC): ICD-10-CM

## 2023-12-28 NOTE — TELEPHONE ENCOUNTER
LOV 7/19/23. LVM asking for patient to return call regarding scheduling an appointment. Order pended for a 2 month supply.

## 2024-02-02 DIAGNOSIS — E11.65 TYPE 2 DIABETES MELLITUS WITH HYPERGLYCEMIA, WITHOUT LONG-TERM CURRENT USE OF INSULIN (HCC): ICD-10-CM

## 2024-03-11 DIAGNOSIS — E11.65 TYPE 2 DIABETES MELLITUS WITH HYPERGLYCEMIA, WITHOUT LONG-TERM CURRENT USE OF INSULIN (HCC): ICD-10-CM

## 2024-03-29 DIAGNOSIS — E11.65 TYPE 2 DIABETES MELLITUS WITH HYPERGLYCEMIA, WITHOUT LONG-TERM CURRENT USE OF INSULIN (HCC): ICD-10-CM

## 2024-04-12 DIAGNOSIS — E11.65 TYPE 2 DIABETES MELLITUS WITH HYPERGLYCEMIA, WITHOUT LONG-TERM CURRENT USE OF INSULIN (HCC): ICD-10-CM

## 2024-04-28 DIAGNOSIS — E11.65 TYPE 2 DIABETES MELLITUS WITH HYPERGLYCEMIA, WITHOUT LONG-TERM CURRENT USE OF INSULIN (HCC): ICD-10-CM

## 2024-05-24 DIAGNOSIS — E11.65 TYPE 2 DIABETES MELLITUS WITH HYPERGLYCEMIA, WITHOUT LONG-TERM CURRENT USE OF INSULIN (HCC): ICD-10-CM

## 2024-06-19 DIAGNOSIS — E11.65 TYPE 2 DIABETES MELLITUS WITH HYPERGLYCEMIA, WITHOUT LONG-TERM CURRENT USE OF INSULIN (HCC): ICD-10-CM

## 2024-08-19 ENCOUNTER — TELEPHONE (OUTPATIENT)
Dept: PULMONOLOGY | Age: 79
End: 2024-08-19

## 2025-08-26 ENCOUNTER — HOSPITAL ENCOUNTER (EMERGENCY)
Age: 80
Discharge: HOME | End: 2025-08-26
Payer: MEDICARE

## 2025-08-26 ENCOUNTER — APPOINTMENT (OUTPATIENT)
Dept: CT IMAGING | Age: 80
End: 2025-08-26
Payer: MEDICARE

## 2025-08-26 ENCOUNTER — APPOINTMENT (OUTPATIENT)
Dept: GENERAL RADIOLOGY | Age: 80
End: 2025-08-26
Payer: MEDICARE

## 2025-08-26 VITALS
BODY MASS INDEX: 31.22 KG/M2 | SYSTOLIC BLOOD PRESSURE: 135 MMHG | RESPIRATION RATE: 16 BRPM | WEIGHT: 159 LBS | TEMPERATURE: 97.7 F | HEIGHT: 60 IN | OXYGEN SATURATION: 92 % | DIASTOLIC BLOOD PRESSURE: 79 MMHG | HEART RATE: 77 BPM

## 2025-08-26 DIAGNOSIS — S09.90XA INJURY OF HEAD, INITIAL ENCOUNTER: Primary | ICD-10-CM

## 2025-08-26 DIAGNOSIS — R73.9 HYPERGLYCEMIA: ICD-10-CM

## 2025-08-26 LAB
ALBUMIN SERPL-MCNC: 3.9 G/DL (ref 3.2–4.6)
ALBUMIN/GLOB SERPL: 1.3 (ref 1–1.9)
ALP SERPL-CCNC: 99 U/L (ref 35–104)
ALT SERPL-CCNC: 16 U/L (ref 8–45)
ANION GAP SERPL CALC-SCNC: 14 MMOL/L (ref 7–16)
AST SERPL-CCNC: 18 U/L (ref 15–37)
BASOPHILS # BLD: 0.11 K/UL (ref 0–0.2)
BASOPHILS NFR BLD: 1.4 % (ref 0–2)
BILIRUB SERPL-MCNC: 0.4 MG/DL (ref 0–1.2)
BUN SERPL-MCNC: 15 MG/DL (ref 8–23)
CALCIUM SERPL-MCNC: 9.4 MG/DL (ref 8.8–10.2)
CHLORIDE SERPL-SCNC: 99 MMOL/L (ref 98–107)
CO2 SERPL-SCNC: 21 MMOL/L (ref 20–29)
CREAT SERPL-MCNC: 0.75 MG/DL (ref 0.6–1.1)
DIFFERENTIAL METHOD BLD: ABNORMAL
EKG ATRIAL RATE: 64 BPM
EKG DIAGNOSIS: NORMAL
EKG P AXIS: 64 DEGREES
EKG P-R INTERVAL: 156 MS
EKG Q-T INTERVAL: 398 MS
EKG QRS DURATION: 72 MS
EKG QTC CALCULATION (BAZETT): 410 MS
EKG R AXIS: 23 DEGREES
EKG T AXIS: 45 DEGREES
EKG VENTRICULAR RATE: 64 BPM
EOSINOPHIL # BLD: 0.2 K/UL (ref 0–0.8)
EOSINOPHIL NFR BLD: 2.5 % (ref 0.5–7.8)
ERYTHROCYTE [DISTWIDTH] IN BLOOD BY AUTOMATED COUNT: 13.1 % (ref 11.9–14.6)
GLOBULIN SER CALC-MCNC: 3.1 G/DL (ref 2.3–3.5)
GLUCOSE BLD STRIP.AUTO-MCNC: 303 MG/DL (ref 65–100)
GLUCOSE BLD STRIP.AUTO-MCNC: 409 MG/DL (ref 65–100)
GLUCOSE SERPL-MCNC: 415 MG/DL (ref 70–99)
HCT VFR BLD AUTO: 46.8 % (ref 35.8–46.3)
HGB BLD-MCNC: 15.4 G/DL (ref 11.7–15.4)
IMM GRANULOCYTES # BLD AUTO: 0.04 K/UL (ref 0–0.5)
IMM GRANULOCYTES NFR BLD AUTO: 0.5 % (ref 0–5)
LYMPHOCYTES # BLD: 2.33 K/UL (ref 0.5–4.6)
LYMPHOCYTES NFR BLD: 29.4 % (ref 13–44)
MCH RBC QN AUTO: 30.5 PG (ref 26.1–32.9)
MCHC RBC AUTO-ENTMCNC: 32.9 G/DL (ref 31.4–35)
MCV RBC AUTO: 92.7 FL (ref 82–102)
MONOCYTES # BLD: 0.49 K/UL (ref 0.1–1.3)
MONOCYTES NFR BLD: 6.2 % (ref 4–12)
NEUTS SEG # BLD: 4.75 K/UL (ref 1.7–8.2)
NEUTS SEG NFR BLD: 60 % (ref 43–78)
NRBC # BLD: 0 K/UL (ref 0–0.2)
PLATELET # BLD AUTO: 187 K/UL (ref 150–450)
PMV BLD AUTO: 9.5 FL (ref 9.4–12.3)
POTASSIUM SERPL-SCNC: 4.3 MMOL/L (ref 3.5–5.1)
PROT SERPL-MCNC: 7 G/DL (ref 6.3–8.2)
RBC # BLD AUTO: 5.05 M/UL (ref 4.05–5.2)
SERVICE CMNT-IMP: ABNORMAL
SERVICE CMNT-IMP: ABNORMAL
SODIUM SERPL-SCNC: 134 MMOL/L (ref 136–145)
WBC # BLD AUTO: 7.9 K/UL (ref 4.3–11.1)

## 2025-08-26 PROCEDURE — 82962 GLUCOSE BLOOD TEST: CPT

## 2025-08-26 PROCEDURE — 85025 COMPLETE CBC W/AUTO DIFF WBC: CPT

## 2025-08-26 PROCEDURE — 93010 ELECTROCARDIOGRAM REPORT: CPT | Performed by: INTERNAL MEDICINE

## 2025-08-26 PROCEDURE — 93005 ELECTROCARDIOGRAM TRACING: CPT | Performed by: EMERGENCY MEDICINE

## 2025-08-26 PROCEDURE — 73502 X-RAY EXAM HIP UNI 2-3 VIEWS: CPT

## 2025-08-26 PROCEDURE — 72125 CT NECK SPINE W/O DYE: CPT

## 2025-08-26 PROCEDURE — 99285 EMERGENCY DEPT VISIT HI MDM: CPT

## 2025-08-26 PROCEDURE — 73562 X-RAY EXAM OF KNEE 3: CPT

## 2025-08-26 PROCEDURE — 96361 HYDRATE IV INFUSION ADD-ON: CPT

## 2025-08-26 PROCEDURE — 80053 COMPREHEN METABOLIC PANEL: CPT

## 2025-08-26 PROCEDURE — 70450 CT HEAD/BRAIN W/O DYE: CPT

## 2025-08-26 PROCEDURE — 2580000003 HC RX 258: Performed by: PHYSICIAN ASSISTANT

## 2025-08-26 PROCEDURE — 6370000000 HC RX 637 (ALT 250 FOR IP): Performed by: PHYSICIAN ASSISTANT

## 2025-08-26 PROCEDURE — 96360 HYDRATION IV INFUSION INIT: CPT

## 2025-08-26 RX ORDER — 0.9 % SODIUM CHLORIDE 0.9 %
1000 INTRAVENOUS SOLUTION INTRAVENOUS
Status: COMPLETED | OUTPATIENT
Start: 2025-08-26 | End: 2025-08-26

## 2025-08-26 RX ORDER — IOPAMIDOL 755 MG/ML
100 INJECTION, SOLUTION INTRAVASCULAR
Status: DISCONTINUED | OUTPATIENT
Start: 2025-08-26 | End: 2025-08-26 | Stop reason: HOSPADM

## 2025-08-26 RX ORDER — 0.9 % SODIUM CHLORIDE 0.9 %
1000 INTRAVENOUS SOLUTION INTRAVENOUS
Status: DISCONTINUED | OUTPATIENT
Start: 2025-08-26 | End: 2025-08-26

## 2025-08-26 RX ORDER — ONDANSETRON 2 MG/ML
4 INJECTION INTRAMUSCULAR; INTRAVENOUS
Status: DISCONTINUED | OUTPATIENT
Start: 2025-08-26 | End: 2025-08-26

## 2025-08-26 RX ORDER — KETOROLAC TROMETHAMINE 15 MG/ML
15 INJECTION, SOLUTION INTRAMUSCULAR; INTRAVENOUS
Status: DISCONTINUED | OUTPATIENT
Start: 2025-08-26 | End: 2025-08-26

## 2025-08-26 RX ADMIN — SODIUM CHLORIDE 1000 ML: 0.9 INJECTION, SOLUTION INTRAVENOUS at 11:43

## 2025-08-26 RX ADMIN — INSULIN HUMAN 4 UNITS: 100 INJECTION, SOLUTION PARENTERAL at 14:57

## 2025-08-26 ASSESSMENT — ENCOUNTER SYMPTOMS
ABDOMINAL DISTENTION: 0
RHINORRHEA: 0
SORE THROAT: 0
SHORTNESS OF BREATH: 0
DIARRHEA: 0
NAUSEA: 0
BACK PAIN: 0
EYE REDNESS: 0
COUGH: 0
CHEST TIGHTNESS: 0
VOMITING: 0

## 2025-08-26 ASSESSMENT — PAIN SCALES - GENERAL
PAINLEVEL_OUTOF10: 0
PAINLEVEL_OUTOF10: 1

## 2025-08-26 ASSESSMENT — PAIN DESCRIPTION - PAIN TYPE: TYPE: ACUTE PAIN

## 2025-08-26 ASSESSMENT — PAIN - FUNCTIONAL ASSESSMENT: PAIN_FUNCTIONAL_ASSESSMENT: 0-10

## 2025-08-26 ASSESSMENT — PAIN DESCRIPTION - DESCRIPTORS: DESCRIPTORS: ACHING

## 2025-08-26 ASSESSMENT — PAIN DESCRIPTION - LOCATION: LOCATION: HEAD

## 2025-09-01 SDOH — HEALTH STABILITY: PHYSICAL HEALTH: ON AVERAGE, HOW MANY DAYS PER WEEK DO YOU ENGAGE IN MODERATE TO STRENUOUS EXERCISE (LIKE A BRISK WALK)?: 0 DAYS

## 2025-09-03 ENCOUNTER — OFFICE VISIT (OUTPATIENT)
Dept: PRIMARY CARE CLINIC | Facility: CLINIC | Age: 80
End: 2025-09-03
Payer: MEDICARE

## 2025-09-03 VITALS
TEMPERATURE: 97.1 F | HEART RATE: 67 BPM | OXYGEN SATURATION: 94 % | DIASTOLIC BLOOD PRESSURE: 76 MMHG | SYSTOLIC BLOOD PRESSURE: 151 MMHG | HEIGHT: 60 IN | BODY MASS INDEX: 31.64 KG/M2 | WEIGHT: 161.13 LBS

## 2025-09-03 DIAGNOSIS — R30.0 BURNING WITH URINATION: ICD-10-CM

## 2025-09-03 DIAGNOSIS — Z79.899 ENCOUNTER FOR LONG-TERM (CURRENT) USE OF MEDICATIONS: ICD-10-CM

## 2025-09-03 DIAGNOSIS — Z13.0 SCREENING FOR DEFICIENCY ANEMIA: ICD-10-CM

## 2025-09-03 DIAGNOSIS — Z13.228 SCREENING FOR ENDOCRINE, NUTRITIONAL, METABOLIC AND IMMUNITY DISORDER: ICD-10-CM

## 2025-09-03 DIAGNOSIS — Z13.0 SCREENING FOR ENDOCRINE, NUTRITIONAL, METABOLIC AND IMMUNITY DISORDER: ICD-10-CM

## 2025-09-03 DIAGNOSIS — E55.9 VITAMIN D DEFICIENCY: ICD-10-CM

## 2025-09-03 DIAGNOSIS — D86.9 SARCOIDOSIS: ICD-10-CM

## 2025-09-03 DIAGNOSIS — E04.1 THYROID NODULE: ICD-10-CM

## 2025-09-03 DIAGNOSIS — E78.00 HYPERCHOLESTEROLEMIA: ICD-10-CM

## 2025-09-03 DIAGNOSIS — Z13.29 SCREENING FOR ENDOCRINE, NUTRITIONAL, METABOLIC AND IMMUNITY DISORDER: ICD-10-CM

## 2025-09-03 DIAGNOSIS — Z13.21 SCREENING FOR ENDOCRINE, NUTRITIONAL, METABOLIC AND IMMUNITY DISORDER: ICD-10-CM

## 2025-09-03 DIAGNOSIS — Z13.29 SCREENING FOR THYROID DISORDER: ICD-10-CM

## 2025-09-03 DIAGNOSIS — K58.0 IRRITABLE BOWEL SYNDROME WITH DIARRHEA: ICD-10-CM

## 2025-09-03 DIAGNOSIS — F41.9 ANXIETY: ICD-10-CM

## 2025-09-03 DIAGNOSIS — E11.65 TYPE 2 DIABETES MELLITUS WITH HYPERGLYCEMIA, WITHOUT LONG-TERM CURRENT USE OF INSULIN (HCC): Primary | ICD-10-CM

## 2025-09-03 DIAGNOSIS — I10 ESSENTIAL HYPERTENSION: ICD-10-CM

## 2025-09-03 PROBLEM — Z96.651 STATUS POST TOTAL RIGHT KNEE REPLACEMENT: Status: RESOLVED | Noted: 2017-07-26 | Resolved: 2025-09-03

## 2025-09-03 LAB
BILIRUBIN, URINE, POC: NEGATIVE
BLOOD URINE, POC: ABNORMAL
GLUCOSE URINE, POC: 1000
KETONES, URINE, POC: NEGATIVE
LEUKOCYTE ESTERASE, URINE, POC: NEGATIVE
NITRITE, URINE, POC: NEGATIVE
PH, URINE, POC: 5.5 (ref 4.6–8)
PROTEIN,URINE, POC: NEGATIVE
SPECIFIC GRAVITY, URINE, POC: 1.01 (ref 1–1.03)
URINALYSIS CLARITY, POC: CLEAR
URINALYSIS COLOR, POC: YELLOW
UROBILINOGEN, POC: ABNORMAL

## 2025-09-03 PROCEDURE — 1159F MED LIST DOCD IN RCRD: CPT | Performed by: FAMILY MEDICINE

## 2025-09-03 PROCEDURE — 81003 URINALYSIS AUTO W/O SCOPE: CPT | Performed by: FAMILY MEDICINE

## 2025-09-03 PROCEDURE — 3077F SYST BP >= 140 MM HG: CPT | Performed by: FAMILY MEDICINE

## 2025-09-03 PROCEDURE — 1160F RVW MEDS BY RX/DR IN RCRD: CPT | Performed by: FAMILY MEDICINE

## 2025-09-03 PROCEDURE — 1123F ACP DISCUSS/DSCN MKR DOCD: CPT | Performed by: FAMILY MEDICINE

## 2025-09-03 PROCEDURE — 99214 OFFICE O/P EST MOD 30 MIN: CPT | Performed by: FAMILY MEDICINE

## 2025-09-03 PROCEDURE — 3078F DIAST BP <80 MM HG: CPT | Performed by: FAMILY MEDICINE

## 2025-09-03 RX ORDER — ACETAMINOPHEN 160 MG
2000 TABLET,DISINTEGRATING ORAL DAILY
Qty: 90 CAPSULE | Refills: 2 | Status: SHIPPED | OUTPATIENT
Start: 2025-09-03 | End: 2025-09-03

## 2025-09-03 RX ORDER — ESCITALOPRAM OXALATE 5 MG/1
5 TABLET ORAL DAILY
Qty: 30 TABLET | Refills: 1 | Status: SHIPPED | OUTPATIENT
Start: 2025-09-03 | End: 2025-10-03

## 2025-09-03 RX ORDER — METFORMIN HYDROCHLORIDE 500 MG/1
1000 TABLET, EXTENDED RELEASE ORAL
Qty: 60 TABLET | Refills: 2 | Status: SHIPPED | OUTPATIENT
Start: 2025-09-03 | End: 2025-12-02

## 2025-09-03 RX ORDER — CEPHALEXIN 500 MG/1
500 CAPSULE ORAL 2 TIMES DAILY
Qty: 10 CAPSULE | Refills: 0 | Status: SHIPPED | OUTPATIENT
Start: 2025-09-03 | End: 2025-09-08

## 2025-09-03 SDOH — ECONOMIC STABILITY: FOOD INSECURITY: WITHIN THE PAST 12 MONTHS, YOU WORRIED THAT YOUR FOOD WOULD RUN OUT BEFORE YOU GOT MONEY TO BUY MORE.: NEVER TRUE

## 2025-09-03 SDOH — ECONOMIC STABILITY: FOOD INSECURITY: WITHIN THE PAST 12 MONTHS, THE FOOD YOU BOUGHT JUST DIDN'T LAST AND YOU DIDN'T HAVE MONEY TO GET MORE.: NEVER TRUE

## 2025-09-03 ASSESSMENT — PATIENT HEALTH QUESTIONNAIRE - PHQ9
SUM OF ALL RESPONSES TO PHQ QUESTIONS 1-9: 0
1. LITTLE INTEREST OR PLEASURE IN DOING THINGS: NOT AT ALL
SUM OF ALL RESPONSES TO PHQ QUESTIONS 1-9: 0
2. FEELING DOWN, DEPRESSED OR HOPELESS: NOT AT ALL

## (undated) DEVICE — PRECISION FALCON OSCILLATING TIP SAW CARTRIDGE: Brand: PRECISION FALCON

## (undated) DEVICE — SYR LR LCK 1ML GRAD NSAF 30ML --

## (undated) DEVICE — X-LARGE COTTON GLOVE: Brand: DEROYAL

## (undated) DEVICE — SYR 50ML LR LCK 1ML GRAD NSAF --

## (undated) DEVICE — DRAPE,TOP,102X53,STERILE: Brand: MEDLINE

## (undated) DEVICE — SUTURE PDS II SZ 1 L96IN ABSRB VLT TP-1 L65MM 1/2 CIR Z880G

## (undated) DEVICE — (D)PREP SKN CHLRAPRP APPL 26ML -- CONVERT TO ITEM 371833

## (undated) DEVICE — SIZER SURG TIB KT DISP TRIATHLON PRECIS

## (undated) DEVICE — BLADE SAW PAT RMR PILT H 41MM --

## (undated) DEVICE — MEDI-VAC YANKAUER SUCTION HANDLE W/BULBOUS TIP: Brand: CARDINAL HEALTH

## (undated) DEVICE — Device

## (undated) DEVICE — (D)SYR 10ML 1/5ML GRAD NSAF -- PKGING CHANGE USE ITEM 338027

## (undated) DEVICE — KIT INSTR 3 FEM POST STBL DISPOSABLE

## (undated) DEVICE — SOLUTION IV DEXTROSE/SALINE 5%-0.9% 500ML - 500ML

## (undated) DEVICE — 2000CC GUARDIAN II: Brand: GUARDIAN

## (undated) DEVICE — SUT ETHBND 2 30IN LR DA GRN --

## (undated) DEVICE — SOLUTION IRRIG 3000ML 0.9% SOD CHL FLX CONT 0797208] ICU MEDICAL INC]

## (undated) DEVICE — PACK PROCEDURE SURG TOT KNEE

## (undated) DEVICE — Z DISCONTINUED USE 2744636  DRESSING AQUACEL 14 IN ALG W3.5XL14IN POLYUR FLM CVR W/ HYDRCOLL

## (undated) DEVICE — SUTURE STRATAFIX SYMMETRIC PDS + SZ 2-0 L18IN ABSRB VLT SXPP1A403

## (undated) DEVICE — 3M™ STERI-DRAPE™ INSTRUMENT POUCH 1018: Brand: STERI-DRAPE™

## (undated) DEVICE — SYRINGE CATH TIP 50ML

## (undated) DEVICE — SOLUTION IV 1000ML 0.9% SOD CHL

## (undated) DEVICE — FAN SPRAY KIT: Brand: PULSAVAC®

## (undated) DEVICE — BIPOLAR SEALER 23-112-1 AQM 6.0: Brand: AQUAMANTYS ®

## (undated) DEVICE — BUTTON SWITCH PENCIL BLADE ELECTRODE, HOLSTER: Brand: EDGE

## (undated) DEVICE — SKIN STAPLER: Brand: SIGNET

## (undated) DEVICE — 3M™ COBAN™ NL STERILE NON-LATEX SELF-ADHERENT WRAP, 2084S, 4 IN X 5 YD (10 CM X 4,5 M), 18 ROLLS/CASE: Brand: 3M™ COBAN™

## (undated) DEVICE — REM POLYHESIVE ADULT PATIENT RETURN ELECTRODE: Brand: VALLEYLAB

## (undated) DEVICE — T4 HOOD

## (undated) DEVICE — 3000CC GUARDIAN II: Brand: GUARDIAN

## (undated) DEVICE — TRAY PREP DRY W/ PREM GLV 2 APPL 6 SPNG 2 UNDPD 1 OVERWRAP

## (undated) DEVICE — SUTURE ABSRB X-1 REV CUT 1/2 CIR 22MM UD BRAID 27IN SZ 3-0 J458H

## (undated) DEVICE — CURETTE BNE CEM 10IN DISP --

## (undated) DEVICE — SUTURE VCRL SZ 1 L27IN ABSRB UD L36MM CP-1 1/2 CIR REV CUT J268H

## (undated) DEVICE — TRAY CATH 16F DRN BG LTX -- CONVERT TO ITEM 363158